# Patient Record
Sex: FEMALE | Race: WHITE | NOT HISPANIC OR LATINO | Employment: FULL TIME | URBAN - NONMETROPOLITAN AREA
[De-identification: names, ages, dates, MRNs, and addresses within clinical notes are randomized per-mention and may not be internally consistent; named-entity substitution may affect disease eponyms.]

---

## 2023-11-07 ENCOUNTER — OFFICE VISIT (OUTPATIENT)
Dept: OBSTETRICS AND GYNECOLOGY | Facility: CLINIC | Age: 33
End: 2023-11-07
Payer: COMMERCIAL

## 2023-11-07 VITALS
HEIGHT: 65 IN | DIASTOLIC BLOOD PRESSURE: 84 MMHG | BODY MASS INDEX: 33.49 KG/M2 | WEIGHT: 201 LBS | SYSTOLIC BLOOD PRESSURE: 142 MMHG

## 2023-11-07 DIAGNOSIS — Z01.411 ENCOUNTER FOR GYNECOLOGICAL EXAMINATION (GENERAL) (ROUTINE) WITH ABNORMAL FINDINGS: Primary | ICD-10-CM

## 2023-11-07 DIAGNOSIS — Z86.19 HISTORY OF HPV INFECTION: ICD-10-CM

## 2023-11-07 DIAGNOSIS — Z12.39 ENCOUNTER FOR BREAST CANCER SCREENING OTHER THAN MAMMOGRAM: ICD-10-CM

## 2023-11-07 DIAGNOSIS — N90.89 VULVAR LESION: ICD-10-CM

## 2023-11-07 PROCEDURE — 99385 PREV VISIT NEW AGE 18-39: CPT | Performed by: OBSTETRICS & GYNECOLOGY

## 2023-11-08 NOTE — PROGRESS NOTES
Chief Complaint  Gynecologic Exam     History of Present Illness:  Patient is 33 y.o.  who presents to [facility] here as a new patient for her annual examination.  Patient reports her last Pap smear was more than 3 years ago.  Patient thinks her last Pap smear was normal.  She does give a history of having HPV.  Patient had previous cryotherapy at the age of 16.  Patient also reports having frequent breakouts of vulvar lesions.  She reports it is worse around the time of her menstrual cycle as well as with stress.  She reports they will be extremely painful in nature.  She has been on antiviral medication in the past.  She thought however it was for HPV.  She has had a previous tubal ligation.  She does have menstrual cycles monthly.  They will vary slightly in timing.  At times they may be heavy.    History  Past Medical History:   Diagnosis Date    Abnormal ECG     Doctor stated small heart murmur, but nothing done.    Abnormal Pap smear of cervix 2006    Pre cancerous cells, used in office freeze treatment.    Herpes 2018    HPV (human papilloma virus) infection 2018    Previous gyno- [facility] before closing did a test and said i was positive. No more info than that.    Multiple gestation ,     2 kids, natural births, tubes tied.    Rh incompatibility     Took shots with both pregnancies.     No current outpatient medications on file prior to visit.     No current facility-administered medications on file prior to visit.     No Known Allergies  Past Surgical History:   Procedure Laterality Date    GYNECOLOGIC CRYOSURGERY      TUBAL ABDOMINAL LIGATION      After second birth of child.    WISDOM TOOTH EXTRACTION       Family History   Problem Relation Age of Onset    Diabetes Father         Takes meds for diabetes.    Diabetes Maternal Grandmother         Takes meds for diabetes.     Social History     Socioeconomic History    Marital status:   "  Tobacco Use    Smoking status: Never   Substance and Sexual Activity    Alcohol use: Not Currently     Comment: Rarely, for special occasions.    Drug use: Never    Sexual activity: Yes     Partners: Male     Birth control/protection: Tubal ligation       Physical Examination:  Vital Signs: /84   Ht 165.1 cm (65\")   Wt 91.2 kg (201 lb)   BMI 33.45 kg/m²     General Appearance: alert, appears stated age, and cooperative  Breasts: Examined in supine position  Symmetric without masses or skin dimpling  Nipples normal without inversion, lesions or discharge  There are no palpable axillary nodes  Abdomen: no masses, no hepatomegaly, no splenomegaly, soft non-tender, no guarding, and no rebound tenderness  Pelvic: Clinical staff was present for exam  External genitalia: Small superficial ulceration noted on the right labia minora.  Culture obtained.  :  urethral meatus normal;  Vaginal:  normal pink mucosa without prolapse or lesions.  Cervix:  normal appearance.  Uterus:  normal size, shape and consistency.  Adnexa:  normal bimanual exam of the adnexa.  Pap smear done and specimen sent using Thin-Prep technique    Data Review:  The following data was reviewed by: Dede Kinsey MD on 11/07/2023:     Labs:    Imaging:    Medical Records:  None    Assessment and Plan   1. Encounter for gynecological examination (general) (routine) with abnormal findings  Pap was done today.  If she does not receive the results of the Pap within 2 weeks  time, she was instructed to call to find out the results.  I explained to Sophie that the recommendations for Pap smear interval in a low risk patient has lengthened to 3 years time if cytology alone normal or  5 years time if both cytology and HPV testing were normal.  I encouraged her to be seen yearly for a full physical exam including breast and pelvic exam even during the off years when PAP's will not be performed.   - LIQUID-BASED PAP SMEAR WITH HPV GENOTYPING IF ASCUS " (ERINN,COR,MAD)    2. Encounter for breast cancer screening other than mammogram  Sophie was counseled regarding having clinical breast exams and breast self-awareness.  Women aged 29-39 years of age should have clinical breast exams every 1-3 years and yearly aged 40 and older.  The patient was counseled regarding breast self-awareness focusing on having a sense of what is normal for her breasts so that she can tell if there are changes.  Even small changes should be reported to provider.     3. Vulvar lesion  Patient with vulvar lesion as noted.  Her symptomatology is consistent with HSV.  We will await the results of her cultures.  I have discussed with the patient both treatment versus prophylaxis.  Patient is to consider the options as discussed.  She will call for culture results.  Instructions and precautions have been given.  - Herpes Simplex Virus (HSV) 1 & 2, JANA    4. History of HPV infection  Pap smear is obtained as noted.  Plan pending results.  - LIQUID-BASED PAP SMEAR WITH HPV GENOTYPING IF ASCUS (ERINN,COR,MAD)    Follow Up/Instructions:  Follow up as noted.  Patient was given instructions and counseling regarding her condition or for health maintenance advice. Please see specific information pulled into the AVS if appropriate.     Note: Speech recognition transcription software may have been used to dictate portions of this document.  An attempt at proofreading has been made though minor errors in transcription may still be present.    This note was electronically signed.  Dede Kinsey M.D.

## 2023-11-10 ENCOUNTER — PATIENT ROUNDING (BHMG ONLY) (OUTPATIENT)
Dept: OBSTETRICS AND GYNECOLOGY | Facility: CLINIC | Age: 33
End: 2023-11-10
Payer: COMMERCIAL

## 2023-11-10 NOTE — PROGRESS NOTES
November 10, 2023    Hello, may I speak with Sophie Dawkins?    My name is Michaela      I am  with [facility]  [address], [address]  [facility] [facility] [address] [phone].    Before we get started may I verify your date of birth? 1990    I am calling to officially welcome you to our practice and ask about your recent visit. Is this a good time to talk? yes    Tell me about your visit with us. What things went well?  Patient states that her appointment went really well.       We're always looking for ways to make our patients' experiences even better. Do you have recommendations on ways we may improve?  no    Overall were you satisfied with your first visit to our practice? yes       I appreciate you taking the time to speak with me today. Is there anything else I can do for you? no      Thank you, and have a great day.

## 2023-11-11 LAB
HSV1 DNA SPEC QL NAA+PROBE: NEGATIVE
HSV2 DNA SPEC QL NAA+PROBE: NEGATIVE

## 2023-11-13 LAB — REF LAB TEST METHOD: NORMAL

## 2025-01-21 ENCOUNTER — OFFICE VISIT (OUTPATIENT)
Dept: INTERNAL MEDICINE | Facility: CLINIC | Age: 35
End: 2025-01-21
Payer: COMMERCIAL

## 2025-01-21 VITALS
HEIGHT: 65 IN | BODY MASS INDEX: 31.16 KG/M2 | TEMPERATURE: 98.5 F | WEIGHT: 187 LBS | OXYGEN SATURATION: 97 % | DIASTOLIC BLOOD PRESSURE: 94 MMHG | HEART RATE: 94 BPM | SYSTOLIC BLOOD PRESSURE: 150 MMHG

## 2025-01-21 DIAGNOSIS — F41.9 ANXIETY: ICD-10-CM

## 2025-01-21 DIAGNOSIS — R03.0 ELEVATED BLOOD PRESSURE READING WITHOUT DIAGNOSIS OF HYPERTENSION: ICD-10-CM

## 2025-01-21 DIAGNOSIS — Z13.29 SCREENING FOR ENDOCRINE, METABOLIC AND IMMUNITY DISORDER: ICD-10-CM

## 2025-01-21 DIAGNOSIS — R22.41 LUMP OF RIGHT THIGH: ICD-10-CM

## 2025-01-21 DIAGNOSIS — R53.83 FATIGUE, UNSPECIFIED TYPE: ICD-10-CM

## 2025-01-21 DIAGNOSIS — Z13.0 SCREENING FOR ENDOCRINE, METABOLIC AND IMMUNITY DISORDER: ICD-10-CM

## 2025-01-21 DIAGNOSIS — R68.82 LOW LIBIDO: ICD-10-CM

## 2025-01-21 DIAGNOSIS — Z13.220 SCREENING FOR CHOLESTEROL LEVEL: ICD-10-CM

## 2025-01-21 DIAGNOSIS — L68.0 HIRSUTISM: ICD-10-CM

## 2025-01-21 DIAGNOSIS — L65.9 HAIR LOSS: ICD-10-CM

## 2025-01-21 DIAGNOSIS — K14.3 TONGUE COATING: ICD-10-CM

## 2025-01-21 DIAGNOSIS — I73.00 RAYNAUD'S DISEASE WITHOUT GANGRENE: ICD-10-CM

## 2025-01-21 DIAGNOSIS — J30.9 ALLERGIC RHINITIS, UNSPECIFIED SEASONALITY, UNSPECIFIED TRIGGER: ICD-10-CM

## 2025-01-21 DIAGNOSIS — Z13.0 SCREENING FOR DEFICIENCY ANEMIA: ICD-10-CM

## 2025-01-21 DIAGNOSIS — Z00.00 ANNUAL PHYSICAL EXAM: Primary | ICD-10-CM

## 2025-01-21 DIAGNOSIS — R06.83 SNORING: ICD-10-CM

## 2025-01-21 DIAGNOSIS — Z11.59 ENCOUNTER FOR HEPATITIS C SCREENING TEST FOR LOW RISK PATIENT: ICD-10-CM

## 2025-01-21 DIAGNOSIS — Z13.228 SCREENING FOR ENDOCRINE, METABOLIC AND IMMUNITY DISORDER: ICD-10-CM

## 2025-01-21 PROCEDURE — 99385 PREV VISIT NEW AGE 18-39: CPT | Performed by: NURSE PRACTITIONER

## 2025-01-21 PROCEDURE — 99214 OFFICE O/P EST MOD 30 MIN: CPT | Performed by: NURSE PRACTITIONER

## 2025-01-21 RX ORDER — FAMOTIDINE 10 MG
10 TABLET ORAL 2 TIMES DAILY
COMMUNITY

## 2025-01-21 RX ORDER — DIPHENOXYLATE HYDROCHLORIDE AND ATROPINE SULFATE 2.5; .025 MG/1; MG/1
TABLET ORAL DAILY
COMMUNITY

## 2025-01-21 NOTE — PROGRESS NOTES
Female Physical Note      Date: 2025   Patient Name: Sophie Dawkins  : 1990   MRN: [id number]     Chief Complaint:    Chief Complaint   Patient presents with    Establish Care     With [facility] today. Pt hasn't seen a primary care provider in 10 years. Has multiple symptoms she would like to discuss today: fatigue (snores at night, can sleep a full 8 hours but wakes up tired), hair loss, cold all the time, anxiety, white film on tongue X couple years, cheeks tend to be red, and possible cyst on inner right thigh near groin area (X 1 month, had chills).   Pt would like to definitely discuss the fatigue, anxiety, and cyst today.     History of Present Illness  34-year-old female presents to establish care/physical. Not been to the doctor in over 10 years.   She has multiple complaints today that were reviewed including tired all the time, not feeling refreshed after sleeping 8 hours per night, loud snoring,potential sleep apnea.  She has loss of hair more so than usual within the past 6 months, feels like there are clumps coming out every time she brushes or takes a shower. She washes hair around 2 to 3 times a week.  Notes she is cold most of the time. Her toes will turn blue as well as her fingers at times.  Stress and anxiety levels are extremely high. She has a mouth guard that she wears at night due to the grinding of her teeth. She works at [employer] and has a high stress environment. She does social media for them.  Her right ear feels like it is leaking sometimes when she wakes up. She has had previous ear infections and dizziness, nausea, and pain, not bothering her today. She has constant draining in the back of her throat.  She has a lump in her right upper thigh, groin area that she noticed around [place]. It was small and did not hurt until last week. It seems to have grown in size. She has not had a fever with it or any redness, erythema or warmth. It is tender to touch.   She has white  "coating on her tongue, which sometimes goes away with antibiotics, but as soon as she is off the antibiotics, it comes back and stays. Nothing seems to get rid of it even with using a tongue scraper.   She has had previous bumps under her armpits and a former provider said it was linked to caffeine, they are painful. She does not have any currently.  She gets black hairs on her chin and she has no sex drive, especially since  after having second child.  She is a  1 para 1. Her Pap is up to date. She does have a history of HPV and precancerous cells. Her previous Pap was normal. She is established with gynecology. Her periods she says are \"somewhat regular\" maybe 1 to 2 weeks off at times depending on her stress and anxiety levels, but otherwise normal, not too heavy or painful.  Vaccines/screenings reviewed     Subjective      Review of Systems:   Pertinent ROS in HPI    Past Medical History, Social History, Family History and Care Team were all reviewed with patient and updated as appropriate.     Medications:     Current Outpatient Medications:     famotidine (PEPCID) 10 MG tablet, Take 1 tablet by mouth 2 (Two) Times a Day., Disp: , Rfl:     multivitamin (MULTI VITAMIN PO), Take  by mouth Daily. Stephanie Mariscal's multivitamin + hair growth, Disp: , Rfl:     Allergies:   No Known Allergies    Immunizations:    There is no immunization history on file for this patient.    Pap:   Last Completed Pap Smear            PAP SMEAR (Every 3 Years) Next due on 2023  LIQUID-BASED PAP SMEAR WITH HPV GENOTYPING IF ASCUS (ERINN,COR,MAD)                   Tobacco Use: Low Risk  (2025)    Patient History     Smoking Tobacco Use: Never     Smokeless Tobacco Use: Never     Passive Exposure: Not on file     Social History     Substance and Sexual Activity   Alcohol Use Not Currently    Comment: Rarely, for special occasions.        Social History     Substance and Sexual Activity   Drug Use Never    "   PHQ-2 Depression Screening  Little interest or pleasure in doing things? Not at all   Feeling down, depressed, or hopeless? Not at all   PHQ-2 Total Score 0       Labs:  Results for orders placed or performed in visit on 23   LIQUID-BASED PAP SMEAR WITH HPV GENOTYPING IF ASCUS (ERINN,COR,MAD)    Collection Time: 23  2:23 PM    Specimen: ThinPrep Vial   Result Value Ref Range    Reference Lab Report       [facility]  [address] [place] [address]  Phone: [phone] or [phone]  Fax: [phone]  Sergey Maguire M.D., Medical Director    PATIENT NAME                                     LABORATORY NO.  [id number]   VERO YING                                 [id number]  [id number]                                 AGE                    SEX   SSN              CLIENT REF #  [facility]                        33        1990      F                      [id number]    [address]          REQUESTING M.PABLO.           ATTENDING M.D.         COPY TO.  [address] [place] [address]                         DATE COLLECTED            DATE RECEIVED          DATE REPORTED  2023    ThinPrep Pap with [facility]    DIAGNOSIS:  Negative for intraepithelial lesion or malignancy    Multiple factors can influence accuracy of Pap  tests; therefore, screening at  regular intervals is necessary for early cancer detection.    COMMENT:     Benign cellular changes associated with reactive/reparative changes  are present.  Professional interpretation rendered by Nir Wilson M.D.,F.C.A.P. at [employer] [facility] [employer], [address] [place] [address].  SPECIMEN ADEQUACY:  SATISFACTORY FOR EVALUATION  Transformation zone is present.  SOURCE OF SPECIMEN:       CERVICAL  SLIDES:  1  CLINICAL HISTORY:  Encounter for gynecological examination (general)  (routine) with  "abnormal findings  History of HPV infection, Vaginal lesion      CYTOTECHNOLOGIST:             [place], [place] (ASCP)                                      REVIEWED, DIAGNOSED AND  ELECTRONICALLY SIGNED BY:      Nir Wilson M.D.,F.C.A.P.    CPT CODES:  97323, 86026     Herpes Simplex Virus (HSV) 1 & 2, JANA    Collection Time: 11/07/23  3:15 PM    Specimen: Cervix; ThinPrep Vial   Result Value Ref Range    HSV 1 JANA Negative Negative    HSV 2 JANA Negative Negative       Objective     Vital Signs:   Vitals:    01/21/25 1406 01/21/25 1515   BP: 152/100 150/94   Pulse: 94    Temp: 98.5 °F (36.9 °C)    SpO2: 97%    Weight: 84.8 kg (187 lb)    Height: 165.1 cm (65\")    PainSc:   6    PainLoc: Leg    BMI is >= 30 and <35. (Class 1 Obesity). The following options were offered after discussion;: Information on healthy weight added to patient's after visit summary.     Physical Exam  Vitals and nursing note reviewed.   Constitutional:       Appearance: Normal appearance.   HENT:      Head: Normocephalic and atraumatic.      Right Ear: Ear canal and external ear normal. A middle ear effusion is present.      Left Ear: Tympanic membrane, ear canal and external ear normal.      Nose: Nose normal.      Mouth/Throat:      Lips: Pink.      Mouth: Mucous membranes are moist.      Pharynx: Oropharynx is clear.      Comments: White coating tongue   Eyes:      General: No scleral icterus.     Extraocular Movements: Extraocular movements intact.      Conjunctiva/sclera: Conjunctivae normal.      Pupils: Pupils are equal, round, and reactive to light.   Neck:      Thyroid: No thyromegaly.   Cardiovascular:      Rate and Rhythm: Normal rate and regular rhythm.      Pulses:           Dorsalis pedis pulses are 2+ on the right side and 2+ on the left side.        Posterior tibial pulses are 2+ on the right side and 2+ on the left side.      Heart sounds: Normal heart sounds.   Pulmonary:      Effort: Pulmonary effort is normal.      Breath sounds: " Normal breath sounds.   Abdominal:      General: Abdomen is flat. Bowel sounds are normal. There is no distension.      Palpations: Abdomen is soft. There is no mass.      Tenderness: There is no abdominal tenderness. There is no guarding or rebound.      Hernia: No hernia is present.   Musculoskeletal:         General: Normal range of motion.      Cervical back: Normal range of motion and neck supple.      Right lower leg: No edema.      Left lower leg: No edema.   Lymphadenopathy:      Cervical: No cervical adenopathy.   Skin:     General: Skin is warm and dry.      Findings: No rash.      Comments: Mass palpated right upper thigh, no erythema, warmth. Tenderness present.    Neurological:      General: No focal deficit present.      Mental Status: She is alert and oriented to person, place, and time. Mental status is at baseline.      Cranial Nerves: No cranial nerve deficit.      Sensory: No sensory deficit.      Motor: No weakness.      Coordination: Coordination normal.      Gait: Gait normal.   Psychiatric:         Mood and Affect: Mood normal.         Behavior: Behavior normal.         Thought Content: Thought content normal.         Judgment: Judgment normal.         Assessment / Plan      Assessment/Plan:   Diagnoses and all orders for this visit:    1. Annual physical exam (Primary)  -     CBC (No Diff)  -     Comprehensive Metabolic Panel  -     Lipid Panel  -     TSH Rfx On Abnormal To Free T4  -     Hemoglobin A1c  -     Vitamin D,25-Hydroxy  -     Vitamin B12  -     Folate  -     Hepatitis C Antibody  -     Iron and TIBC  -     Ferritin  -     17-Hydroxyprogesterone  -     Prolactin  -     Follicle Stimulating Hormone  -     Testosterone (Free & Total), LC / MS  -     DHEA-Sulfate    2. Screening for deficiency anemia  -     CBC (No Diff)  -     Vitamin B12  -     Folate  -     Iron and TIBC  -     Ferritin    3. Screening for cholesterol level  -     Lipid Panel    4. Screening for endocrine,  metabolic and immunity disorder  -     Comprehensive Metabolic Panel  -     TSH Rfx On Abnormal To Free T4  -     Hemoglobin A1c  -     17-Hydroxyprogesterone  -     Prolactin  -     Follicle Stimulating Hormone  -     Testosterone (Free & Total), LC / MS  -     DHEA-Sulfate    5. Encounter for hepatitis C screening test for low risk patient  -     Hepatitis C Antibody    6. Fatigue, unspecified type  -     CBC (No Diff)  -     Comprehensive Metabolic Panel  -     Lipid Panel  -     TSH Rfx On Abnormal To Free T4  -     Hemoglobin A1c  -     Vitamin D,25-Hydroxy  -     Vitamin B12  -     Folate  -     Hepatitis C Antibody  -     Iron and TIBC  -     Ferritin  -     17-Hydroxyprogesterone  -     Prolactin  -     Follicle Stimulating Hormone  -     Testosterone (Free & Total), LC / MS  -     DHEA-Sulfate  -     Ambulatory Referral to Neurology    7. Snoring  -     Ambulatory Referral to Neurology    8. Hair loss  -     CBC (No Diff)  -     Comprehensive Metabolic Panel  -     TSH Rfx On Abnormal To Free T4  -     Hemoglobin A1c  -     Vitamin D,25-Hydroxy  -     Vitamin B12  -     Folate  -     Iron and TIBC  -     Ferritin  -     17-Hydroxyprogesterone  -     Prolactin  -     Follicle Stimulating Hormone  -     Testosterone (Free & Total), LC / MS  -     DHEA-Sulfate    9. Raynaud's disease without gangrene  -     CBC (No Diff)  -     TSH Rfx On Abnormal To Free T4  -     Vitamin B12  -     Folate  -     Iron and TIBC  -     Ferritin    10. Anxiety  -     TSH Rfx On Abnormal To Free T4  -     17-Hydroxyprogesterone  -     Prolactin  -     Follicle Stimulating Hormone  -     Testosterone (Free & Total), LC / MS  -     DHEA-Sulfate  -     Ambulatory Referral to Psychiatry    11. Elevated blood pressure reading without diagnosis of hypertension  -     CBC (No Diff)  -     Comprehensive Metabolic Panel  -     Lipid Panel  -     TSH Rfx On Abnormal To Free T4    12. Allergic rhinitis, unspecified seasonality, unspecified  trigger  -     CBC (No Diff)    13. Lump of right thigh  -     Ambulatory Referral to General Surgery    14. Tongue coating  -     CBC (No Diff)  -     Comprehensive Metabolic Panel  -     TSH Rfx On Abnormal To Free T4  -     Hemoglobin A1c  -     Vitamin D,25-Hydroxy  -     Vitamin B12  -     Folate  -     Iron and TIBC  -     Ferritin    15. Hirsutism  -     TSH Rfx On Abnormal To Free T4  -     17-Hydroxyprogesterone  -     Prolactin  -     Follicle Stimulating Hormone  -     Testosterone (Free & Total), LC / MS  -     DHEA-Sulfate    16. Low libido  -     TSH Rfx On Abnormal To Free T4  -     17-Hydroxyprogesterone  -     Prolactin  -     Follicle Stimulating Hormone  -     Testosterone (Free & Total), LC / MS  -     DHEA-Sulfate       Assessment & Plan  Referral to neurology has been made to further evaluate fatigue and snoring, which are indicative of potential sleep apnea.  Raynaud's disease is suspected, mild. Information reviewed and printed for patient regarding management. CCB cab be considered for severe symptoms.   Referral to psychiatry placed for anxiety   Her anxiety may be contributing to her elevated blood pressure, but it is necessary to compare home readings with office readings. She has been advised to purchase a blood pressure cuff and monitor her blood pressure twice daily until her next appointment, maintaining a log. She has been advised to limit her caffeine intake to less than 200 mg per day, increase her water intake, and engage in at least 30 minutes of cardiovascular exercise five times a week. If her blood pressure consistently exceeds 140/90, treatment options will be considered.  She has been advised to use Flonase (fluticasone nasal spray), administering 2 sprays in each nostril, for allergic rhinitis. The correct administration technique was demonstrated. An over-the-counter oral antihistamine was also recommended. If her symptoms do not improve, a referral to an allergist or ENT  will be considered.  Lump on right thigh is suspected to be cyst or lipoma. It is tender and painful, but there are no signs of infection such as erythema, warmth, or fever. Due to worsening condition, increasing size, and the pain and discomfort it is causing her, a consult with general surgery is placed.   White tongue coating could be due to various factors such as dehydration, poor oral hygiene, thrush, vitamin deficiencies. Laboratory tests will be ordered to further evaluate. Referral to an ENT will be considered.  Hormone tests will be ordered to further evaluate for abnormalities and androgen excess due to hirsutism and low libido, hair loss, fatigue and other complaints.       Healthcare Maintenance:  Counseling provided based on age appropriate USPSTF guidelines and vaccinations   Encouraged 150 minutes of exercise total per week for heart health   Recommend balanced healthy diet   Dental visits twice per year, yearly eye exams, yearly skin assessments with dermatology   Pap every 3-5 years, self breast exam once per month    Sophie Dawkins voices understanding and acceptance of this advice and will call back with any further questions or concerns. AVS with preventive healthcare tips printed for patient.     Follow Up:   Return in about 1 month (around 2/21/2025) for blood pressure, labs .      LUCY Rivera  [facility]

## 2025-01-24 NOTE — PROGRESS NOTES
Patient: Sophie Dawkins    YOB: 1990    Date: 01/28/2025    Primary Care Provider: Shante Grey APRN    Chief Complaint   Patient presents with    Mass     right upper thigh/groin area.       SUBJECTIVE:    History of present illness:  The patient is in the office today for evaluation and treatment of a mass in right upper thigh/groin area. She states that onset was around [place]. She states originally it felt like a small nodule and it was not painful. Over the past two weeks, it has gotten larger and is now tender to palpation. She also reports overlying skin erythema. She denies any drainage. She has never had anything like this in the past.     The following portions of the patient's history were reviewed and updated as appropriate: allergies, current medications, past family history, past medical history, past social history, past surgical history and problem list.      Review of Systems:  Constitutional:  Negative for chills, fever, and unexpected weight change.  HENT: Negative for trouble swallowing and voice change.  Eyes:  Negative for visual disturbance.  Respiratory:  Negative for apnea, cough, chest tightness, shortness of breath, and wheezing.  Cardiovascular:  Negative for chest pain, palpitations, and leg swelling.  Gastrointestinal:  Negative for abdominal distention, abdominal pain, anal bleeding, blood in stool, constipation, diarrhea, nausea, rectal pain, and vomiting.  Musculoskeletal:  Negative for back pain, gait problem, and joint swelling.  Skin: Positive for right anterior thigh mass and pain.  Neurological:  Negative for dizziness, syncope, speech difficulty, weakness, numbness, and headaches.  Hematological:  Negative for adenopathy.  Does not bruise/bleed easily.  Psychiatric/Behavioral:  Negative for confusion.  The patient is not nervous/anxious.    Allergies:  No Known Allergies    Medications:    Current Outpatient Medications:     famotidine (PEPCID) 10 MG  tablet, Take 1 tablet by mouth 2 (Two) Times a Day., Disp: , Rfl:     ferrous sulfate 325 (65 FE) MG tablet, Take 1 tablet by mouth Daily With Breakfast. (Patient not taking: Reported on 1/28/2025), Disp: 90 tablet, Rfl: 0    multivitamin (MULTI VITAMIN PO), Take  by mouth Daily. Stephanie Mariscal's multivitamin + hair growth (Patient not taking: Reported on 1/28/2025), Disp: , Rfl:     mupirocin (BACTROBAN) 2 % ointment, Apply 1 Application topically to the appropriate area as directed 3 (Three) Times a Day. (Patient not taking: Reported on 1/28/2025), Disp: 30 g, Rfl: 0    vitamin D (ERGOCALCIFEROL) 1.25 MG (49206 UT) capsule capsule, Take 1 capsule by mouth 1 (One) Time Per Week. (Patient not taking: Reported on 1/28/2025), Disp: 12 capsule, Rfl: 0    History:  Past Medical History:   Diagnosis Date    Abnormal ECG 2000    Doctor stated small heart murmur, but nothing done.    Abnormal Pap smear of cervix 2006    Pre cancerous cells, used in office freeze treatment.    Anxiety     Chronic ear infection     right ear mostly    Headache     Herpes 2018    HPV (human papilloma virus) infection 2018    Previous gyno- [facility] before closing did a test and said i was positive. No more info than that.    Hypertension 1/21/25    Dr. De La Torre for a month.    Multiple gestation 2007, 2010    2 kids, natural births, tubes tied.    Rh incompatibility 2007    Took shots with both pregnancies.       Past Surgical History:   Procedure Laterality Date    GYNECOLOGIC CRYOSURGERY      TUBAL ABDOMINAL LIGATION  2010    After second birth of child.    WISDOM TOOTH EXTRACTION         Family History   Problem Relation Age of Onset    Diabetes Father         Type 2.    Depression Father     Hyperlipidemia Father     Hypertension Father     Diabetes Maternal Grandmother         Type 2.    Hyperlipidemia Maternal Grandmother     Vision loss Maternal Grandmother         Glaucoma / Wet Macular Degeneration    Hypertension Maternal  "Grandmother     Anxiety disorder Mother     Depression Mother     Hyperlipidemia Mother     Thyroid disease Mother         Grave’s Disease    Hypertension Mother     Heart disease Maternal Grandfather          of heart attack with sepsis.    Hyperlipidemia Maternal Grandfather     Hypertension Maternal Grandfather        Social History     Tobacco Use    Smoking status: Never    Smokeless tobacco: Never   Vaping Use    Vaping status: Never Used   Substance Use Topics    Alcohol use: Not Currently     Comment: Rarely, for special occasions.    Drug use: Never        OBJECTIVE:    Vital Signs:   Vitals:    25 1117   BP: 118/68   Pulse: 107   Resp: 18   Temp: 97.7 °F (36.5 °C)   TempSrc: Temporal   SpO2: 99%   Weight: 82.6 kg (182 lb)   Height: 165.1 cm (65\")     Physical Exam:     General Appearance:    Alert, cooperative, in no acute distress   Head:    Normocephalic, without obvious abnormality, atraumatic   Eyes:            Lids and lashes normal, conjunctivae and sclerae normal, no   icterus, no pallor, corneas clear, PERRLA   Throat:   No oral lesions, no thrush, oral mucosa moist   Lungs:     Clear to auscultation,respirations regular, even and                  unlabored    Heart:    Regular rhythm and normal rate, normal S1 and S2, no            murmur, no gallop, no rub, no click   Abdomen:     Normal bowel sounds, no masses, no organomegaly, soft        non-tender, non-distended, no guarding   Extremities:   Moves all extremities well, no edema, no cyanosis, no             redness   Pulses:   Pulses palpable and equal bilaterally   Skin:   Right anteromedial thigh/groin swelling, tender to palpation, overlying skin erythema.   Neurologic:   Cranial nerves 2 - 12 grossly intact, sensation intact, DTR       present and equal bilaterally       Results Review:   None    Review of Systems was reviewed and confirmed as accurate as documented by the MA.    ASSESSMENT/PLAN:    1. Mass of right thigh  "     Patient was referred to me for a mass/swelling on her anteromedial right thigh. On physical exam, my differential is large infected cyst versus lymphadenopathy versus femoral hernia. I recommend proceeding with office ultrasound of the area. Office ultrasound completed and appears to be enlarged lymph nodes with significant surrounding tissue edema. I recommend proceeding with ultrasound guided needle aspiration. We will start her on an antibiotic as well. Will call the patient when results are available. Patient is agreeable to the plan and all questions were answered.      Electronically signed by Franny Cornejo DO  01/28/25    Procedure:    I recommend a FNA of right anterior thigh lesion. The procedure and risks were clearly explained including bleeding, infection and requirement for re-biopsy and the patient understood these and wishes to proceed.    The patient was brought to the procedure room. Consent and time out were performed. The area was prepped and draped in the usual fashion. 1% lidocaine with epinephrine was infused locally. An 18G needle was used to aspirate the presumed node with ultrasound guidance. Minimal amount of blood and tissue was collected. This was sent to pathology for cytology. Another 18G needle was used to aspirate fluid around the node which returned 2 cc of purulent fluid. This fluid was sent for culture. Minimal blood loss had occurred and hemostasis had been well controlled with pressure.  The patient tolerated the procedure and there were no complications. A band-aid was placed. I will call the patient with the results when they are available.

## 2025-01-26 LAB
17OHP SERPL-MCNC: NORMAL NG/DL
25(OH)D3+25(OH)D2 SERPL-MCNC: 11.9 NG/ML (ref 30–100)
ALBUMIN SERPL-MCNC: 4 G/DL (ref 3.5–5.2)
ALBUMIN/GLOB SERPL: 1.1 G/DL
ALP SERPL-CCNC: 58 U/L (ref 39–117)
ALT SERPL-CCNC: 8 U/L (ref 1–33)
AST SERPL-CCNC: 11 U/L (ref 1–32)
BILIRUB SERPL-MCNC: 0.6 MG/DL (ref 0–1.2)
BUN SERPL-MCNC: 11 MG/DL (ref 6–20)
BUN/CREAT SERPL: 11.8 (ref 7–25)
CALCIUM SERPL-MCNC: 9.1 MG/DL (ref 8.6–10.5)
CHLORIDE SERPL-SCNC: 100 MMOL/L (ref 98–107)
CHOLEST SERPL-MCNC: 185 MG/DL (ref 0–200)
CO2 SERPL-SCNC: 25.7 MMOL/L (ref 22–29)
CREAT SERPL-MCNC: 0.93 MG/DL (ref 0.57–1)
DHEA-S SERPL-MCNC: 383 UG/DL (ref 57.3–279.2)
EGFRCR SERPLBLD CKD-EPI 2021: 81.9 ML/MIN/1.73
ERYTHROCYTE [DISTWIDTH] IN BLOOD BY AUTOMATED COUNT: 16.5 % (ref 12.3–15.4)
FERRITIN SERPL-MCNC: 47.9 NG/ML (ref 13–150)
FOLATE SERPL-MCNC: 12.1 NG/ML (ref 4.78–24.2)
FSH SERPL-ACNC: 6.2 MIU/ML
GLOBULIN SER CALC-MCNC: 3.5 GM/DL
GLUCOSE SERPL-MCNC: 128 MG/DL (ref 65–99)
HBA1C MFR BLD: 6.5 % (ref 4.8–5.6)
HCT VFR BLD AUTO: 38.3 % (ref 34–46.6)
HCV IGG SERPL QL IA: NON REACTIVE
HDLC SERPL-MCNC: 37 MG/DL (ref 40–60)
HGB BLD-MCNC: 11.4 G/DL (ref 12–15.9)
IRON SATN MFR SERPL: 5 % (ref 20–50)
IRON SERPL-MCNC: 21 MCG/DL (ref 37–145)
LDLC SERPL CALC-MCNC: 125 MG/DL (ref 0–100)
MCH RBC QN AUTO: 22.4 PG (ref 26.6–33)
MCHC RBC AUTO-ENTMCNC: 29.8 G/DL (ref 31.5–35.7)
MCV RBC AUTO: 75.4 FL (ref 79–97)
PLATELET # BLD AUTO: 492 10*3/MM3 (ref 140–450)
POTASSIUM SERPL-SCNC: 4.1 MMOL/L (ref 3.5–5.2)
PROLACTIN SERPL-MCNC: 30.7 NG/ML (ref 4.8–33.4)
PROT SERPL-MCNC: 7.5 G/DL (ref 6–8.5)
RBC # BLD AUTO: 5.08 10*6/MM3 (ref 3.77–5.28)
SODIUM SERPL-SCNC: 136 MMOL/L (ref 136–145)
TESTOST FREE SERPL-MCNC: 3.8 PG/ML (ref 0–4.2)
TESTOST SERPL-MCNC: 16.1 NG/DL (ref 10–55)
TIBC SERPL-MCNC: 405 MCG/DL
TRIGL SERPL-MCNC: 124 MG/DL (ref 0–150)
TSH SERPL DL<=0.005 MIU/L-ACNC: 0.86 UIU/ML (ref 0.27–4.2)
UIBC SERPL-MCNC: 384 MCG/DL (ref 112–346)
VIT B12 SERPL-MCNC: 367 PG/ML (ref 211–946)
VLDLC SERPL CALC-MCNC: 23 MG/DL (ref 5–40)
WBC # BLD AUTO: 8.74 10*3/MM3 (ref 3.4–10.8)

## 2025-01-27 ENCOUNTER — REFERRAL TRIAGE (OUTPATIENT)
Dept: CASE MANAGEMENT | Facility: OTHER | Age: 35
End: 2025-01-27
Payer: COMMERCIAL

## 2025-01-27 ENCOUNTER — OFFICE VISIT (OUTPATIENT)
Dept: INTERNAL MEDICINE | Facility: CLINIC | Age: 35
End: 2025-01-27
Payer: COMMERCIAL

## 2025-01-27 VITALS — HEART RATE: 105 BPM | TEMPERATURE: 97.6 F | OXYGEN SATURATION: 98 %

## 2025-01-27 DIAGNOSIS — E11.65 UNCONTROLLED DIABETES MELLITUS WITH HYPERGLYCEMIA, WITHOUT LONG-TERM CURRENT USE OF INSULIN: Primary | ICD-10-CM

## 2025-01-27 DIAGNOSIS — R79.89 ELEVATED DHEA: ICD-10-CM

## 2025-01-27 DIAGNOSIS — E11.65 TYPE 2 DIABETES MELLITUS WITH HYPERGLYCEMIA, WITHOUT LONG-TERM CURRENT USE OF INSULIN: Primary | ICD-10-CM

## 2025-01-27 DIAGNOSIS — E55.9 VITAMIN D DEFICIENCY: ICD-10-CM

## 2025-01-27 DIAGNOSIS — D50.8 IRON DEFICIENCY ANEMIA SECONDARY TO INADEQUATE DIETARY IRON INTAKE: ICD-10-CM

## 2025-01-27 DIAGNOSIS — D50.8 OTHER IRON DEFICIENCY ANEMIA: ICD-10-CM

## 2025-01-27 DIAGNOSIS — N92.1 MENORRHAGIA WITH IRREGULAR CYCLE: ICD-10-CM

## 2025-01-27 DIAGNOSIS — L08.9 INFECTION, FACE: ICD-10-CM

## 2025-01-27 PROCEDURE — 99214 OFFICE O/P EST MOD 30 MIN: CPT | Performed by: NURSE PRACTITIONER

## 2025-01-27 RX ORDER — FERROUS SULFATE 325(65) MG
325 TABLET ORAL
Qty: 90 TABLET | Refills: 0 | Status: SHIPPED | OUTPATIENT
Start: 2025-01-27

## 2025-01-27 RX ORDER — ERGOCALCIFEROL 1.25 MG/1
50000 CAPSULE, LIQUID FILLED ORAL WEEKLY
Qty: 12 CAPSULE | Refills: 0 | Status: SHIPPED | OUTPATIENT
Start: 2025-01-27

## 2025-01-27 RX ORDER — MUPIROCIN 20 MG/G
1 OINTMENT TOPICAL 3 TIMES DAILY
Qty: 30 G | Refills: 0 | Status: SHIPPED | OUTPATIENT
Start: 2025-01-27

## 2025-01-27 NOTE — PROGRESS NOTES
Office Visit      Date: 2025   Patient Name: Sophie Dawkins  : 1990   MRN: [id number]     Chief Complaint:    Chief Complaint   Patient presents with    Follow-up     To discuss recent lab results    Abstract     red place under left eye X couple days, just woke up with it       History of Present Illness: Sophie Dawkins is a 34 y.o. female presenting with significant other for lab review. Labs show FER, Vitamin D deficiency, A1c 6.5% and glucose 128, elevated DHEA. She has a significant family history of T2DM. Diet and poor and drinks a lot of soda.   Red skin lesion under left eye x 2 days, woke up with it.    Subjective      Review of Systems:   Pertinent ROS noted in HPI.     I have reviewed the patients family history, social history, past medical history, past surgical history and have updated it as appropriate.     Medications:     Current Outpatient Medications:     famotidine (PEPCID) 10 MG tablet, Take 1 tablet by mouth 2 (Two) Times a Day., Disp: , Rfl:     multivitamin (MULTI VITAMIN PO), Take  by mouth Daily. Stephanie Mariscal's multivitamin + hair growth, Disp: , Rfl:     ferrous sulfate 325 (65 FE) MG tablet, Take 1 tablet by mouth Daily With Breakfast., Disp: 90 tablet, Rfl: 0    mupirocin (BACTROBAN) 2 % ointment, Apply 1 Application topically to the appropriate area as directed 3 (Three) Times a Day., Disp: 30 g, Rfl: 0    vitamin D (ERGOCALCIFEROL) 1.25 MG (01380 UT) capsule capsule, Take 1 capsule by mouth 1 (One) Time Per Week., Disp: 12 capsule, Rfl: 0    Allergies:   No Known Allergies    Objective     Physical Exam  Vitals and nursing note reviewed.   Constitutional:       Appearance: Normal appearance.   Eyes:      Extraocular Movements: Extraocular movements intact.     Cardiovascular:      Rate and Rhythm: Normal rate.   Pulmonary:      Effort: Pulmonary effort is normal.   Musculoskeletal:         General: Normal range of motion.      Cervical back: Normal range of motion.    Skin:     General: Skin is dry.   Neurological:      Mental Status: She is alert and oriented to person, place, and time.   Psychiatric:         Mood and Affect: Mood normal.         Vital Signs:   Vitals:    01/27/25 1117   Pulse: 105   Temp: 97.6 °F (36.4 °C)   SpO2: 98%     There is no height or weight on file to calculate BMI.        Labs:   Office Visit on 01/21/2025   Component Date Value Ref Range Status    WBC 01/23/2025 8.74  3.40 - 10.80 10*3/mm3 Final    RBC 01/23/2025 5.08  3.77 - 5.28 10*6/mm3 Final    Hemoglobin 01/23/2025 11.4 (L)  12.0 - 15.9 g/dL Final    Hematocrit 01/23/2025 38.3  34.0 - 46.6 % Final    MCV 01/23/2025 75.4 (L)  79.0 - 97.0 fL Final    MCH 01/23/2025 22.4 (L)  26.6 - 33.0 pg Final    MCHC 01/23/2025 29.8 (L)  31.5 - 35.7 g/dL Final    RDW 01/23/2025 16.5 (H)  12.3 - 15.4 % Final    Platelets 01/23/2025 492 (H)  140 - 450 10*3/mm3 Final    Glucose 01/23/2025 128 (H)  65 - 99 mg/dL Final    BUN 01/23/2025 11  6 - 20 mg/dL Final    Creatinine 01/23/2025 0.93  0.57 - 1.00 mg/dL Final    EGFR Result 01/23/2025 81.9  >60.0 mL/min/1.73 Final    Comment: GFR Categories in Chronic Kidney Disease (CKD)/X09/  /X09/  GFR Category          GFR (mL/min/1.73)    Interpretation  G1/X09/                    90 or greater/X09/        Normal or high  (1)  G2//                    60-89                Mild decrease  (1)  G3a                   45-59                Mild to moderate  decrease  G3b                   30-44                Moderate to  severe decrease  G4                    15-29                Severe decrease  G5                    14 or less           Kidney failure//  /[id number]/  (1)In the absence of evidence of kidney disease, neither  GFR category G1 or G2 fulfill the criteria for CKD.  eGFR calculation 2021 CKD-EPI creatinine equation, which  does not include race as a factor      BUN/Creatinine Ratio 01/23/2025 11.8  7.0 - 25.0 Final    Sodium 01/23/2025 136  136 - 145  mmol/L Final    Potassium 01/23/2025 4.1  3.5 - 5.2 mmol/L Final    Chloride 01/23/2025 100  98 - 107 mmol/L Final    Total CO2 01/23/2025 25.7  22.0 - 29.0 mmol/L Final    Calcium 01/23/2025 9.1  8.6 - 10.5 mg/dL Final    Total Protein 01/23/2025 7.5  6.0 - 8.5 g/dL Final    Albumin 01/23/2025 4.0  3.5 - 5.2 g/dL Final    Globulin 01/23/2025 3.5  gm/dL Final    A/G Ratio 01/23/2025 1.1  g/dL Final    Total Bilirubin 01/23/2025 0.6  0.0 - 1.2 mg/dL Final    Alkaline Phosphatase 01/23/2025 58  39 - 117 U/L Final    AST (SGOT) 01/23/2025 11  1 - 32 U/L Final    ALT (SGPT) 01/23/2025 8  1 - 33 U/L Final    Total Cholesterol 01/23/2025 185  0 - 200 mg/dL Final    Comment: Cholesterol Reference Ranges  (U.S. Department of Health and Human Services ATP III  Classifications)  Desirable          <200 mg/dL  Borderline High    200-239 mg/dL  High Risk          >240 mg/dL  Triglyceride Reference Ranges  (U.S. Department of Health and Human Services ATP III  Classifications)  Normal           <150 mg/dL  Borderline High  150-199 mg/dL  High             200-499 mg/dL  Very High        >500 mg/dL  HDL Reference Ranges  (U.S. Department of Health and Human Services ATP III  Classifications)  Low     <40 mg/dl (major risk factor for CHD)  High    >60 mg/dl ('negative' risk factor for CHD)  LDL Reference Ranges  (U.S. Department of Health and Human Services ATP III  Classifications)  Optimal          <100 mg/dL  Near Optimal     100-129 mg/dL  Borderline High  130-159 mg/dL  High             160-189 mg/dL  Very High        >189 mg/dL  LDL is calculated using the NIH LDL-C calculation.      Triglycerides 01/23/2025 124  0 - 150 mg/dL Final    HDL Cholesterol 01/23/2025 37 (L)  40 - 60 mg/dL Final    VLDL Cholesterol Vern 01/23/2025 23  5 - 40 mg/dL Final    LDL Chol Calc (NIH) 01/23/2025 125 (H)  0 - 100 mg/dL Final    TSH 01/23/2025 0.858  0.270 - 4.200 uIU/mL Final    Hemoglobin A1C 01/23/2025 6.50 (H)  4.80 - 5.60 % Final     Comment: Hemoglobin A1C Ranges:  Increased Risk for Diabetes  5.7% to 6.4%  Diabetes                     >= 6.5%  Diabetic Goal                < 7.0%      25 Hydroxy, Vitamin D 01/23/2025 11.9 (L)  30.0 - 100.0 ng/ml Final    Comment: Reference Range for Total Vitamin D 25(OH)  Deficiency <20.0 ng/mL  Insufficiency 21-29 ng/mL  Sufficiency  ng/mL  Toxicity >100 ng/ml      Vitamin B-12 01/23/2025 367  211 - 946 pg/mL Final    Results may be falsely increased if patient taking Biotin.    Folate 01/23/2025 12.10  4.78 - 24.20 ng/mL Final    Results may be falsely increased if patient taking Biotin.    Hep C Virus Ab 01/23/2025 Non Reactive  Non Reactive Final    Comment: HCV antibody alone does not differentiate between previously  resolved infection and active infection. Equivocal and Reactive  HCV antibody results should be followed up with an HCV RNA test  to support the diagnosis of active HCV infection.      TIBC 01/23/2025 405  mcg/dL Final    UIBC 01/23/2025 384 (H)  112 - 346 mcg/dL Final    Iron 01/23/2025 21 (L)  37 - 145 mcg/dL Final    Iron Saturation 01/23/2025 5 (L)  20 - 50 % Final    Ferritin 01/23/2025 47.90  13.00 - 150.00 ng/mL Final    Results may be falsely decreased if patient taking Biotin.    Prolactin 01/23/2025 30.7  4.8 - 33.4 ng/mL Final    FSH 01/23/2025 6.2  mIU/mL Final    Comment:                      Adult Female             Range                        Follicular phase      3.5 -  12.5                        Ovulation phase       4.7 -  21.5                        Luteal phase          1.7 -   7.7                        Postmenopausal       25.8 - 134.8      Testosterone, Total 01/23/2025 16.1  10.0 - 55.0 ng/dL Final    Testosterone, Free 01/23/2025 3.8  0.0 - 4.2 pg/mL Final    DHEA-Sulfate 01/23/2025 383.0 (H)  57.3 - 279.2 ug/dL Final        Assessment / Plan      Assessment/Plan:   Diagnoses and all orders for this visit:    1. Type 2 diabetes mellitus with hyperglycemia,  without long-term current use of insulin (Primary)  -     Ambulatory Referral to Chronic Care Management Disease Education, Preventative Care, Caregiving/Support    2. Iron deficiency anemia secondary to inadequate dietary iron intake  -     ferrous sulfate 325 (65 FE) MG tablet; Take 1 tablet by mouth Daily With Breakfast.  Dispense: 90 tablet; Refill: 0  -     CBC & Differential; Future  -     Iron and TIBC; Future  -     Ferritin; Future    3. Vitamin D deficiency  -     vitamin D (ERGOCALCIFEROL) 1.25 MG (30913 UT) capsule capsule; Take 1 capsule by mouth 1 (One) Time Per Week.  Dispense: 12 capsule; Refill: 0    4. Infection, face  -     mupirocin (BACTROBAN) 2 % ointment; Apply 1 Application topically to the appropriate area as directed 3 (Three) Times a Day.  Dispense: 30 g; Refill: 0    5. Elevated DHEA    6. Menorrhagia with irregular cycle       Newly diagnosed Type 2 Diabetic   Follow diabetic diet by decreasing carbohydrates, sugar, and processed foods.   Exercise encouraged as tolerated-- discussed low impact walking 30 minutes/day 5 days/week minimally.   Discussed metformin and other medications; pt wishes to work on lifestyle changes first   Annual eye exam encouraged  Risk discussion regarding uncontrolled diabetes  Follow up 3 months after lifestyle changes for recheck A1c  Referral chronic care manager    FER  Start ferrous sulfate daily with breakfast, increase iron in diet and vitamin C to increase absorption. Recheck labs 6 weeks     Vitamin D deficiency   Start high dose Vitamin D supplement once a week x 12 weeks, then take 5000 IU daily otc    Infection face  Start mupirocin ointment to site on face TID, if no improvement consider oral antibiotics    Elevated DHEA  Reviewed differentials. She is going to schedule with gynecology as she has heavy menstruals first 2 days, hirituism and irregular periods      Follow Up:   Return in about 3 months (around 4/27/2025) for Diabetes.        Shante  Que AYALA  [facility] [place]

## 2025-01-28 ENCOUNTER — PATIENT MESSAGE (OUTPATIENT)
Dept: CASE MANAGEMENT | Facility: OTHER | Age: 35
End: 2025-01-28
Payer: COMMERCIAL

## 2025-01-28 ENCOUNTER — PATIENT OUTREACH (OUTPATIENT)
Dept: CASE MANAGEMENT | Facility: OTHER | Age: 35
End: 2025-01-28
Payer: COMMERCIAL

## 2025-01-28 ENCOUNTER — OFFICE VISIT (OUTPATIENT)
Dept: SURGERY | Facility: CLINIC | Age: 35
End: 2025-01-28
Payer: COMMERCIAL

## 2025-01-28 VITALS
DIASTOLIC BLOOD PRESSURE: 68 MMHG | BODY MASS INDEX: 30.32 KG/M2 | WEIGHT: 182 LBS | HEIGHT: 65 IN | HEART RATE: 107 BPM | RESPIRATION RATE: 18 BRPM | SYSTOLIC BLOOD PRESSURE: 118 MMHG | TEMPERATURE: 97.7 F | OXYGEN SATURATION: 99 %

## 2025-01-28 DIAGNOSIS — E11.65 UNCONTROLLED DIABETES MELLITUS WITH HYPERGLYCEMIA, WITHOUT LONG-TERM CURRENT USE OF INSULIN: Primary | ICD-10-CM

## 2025-01-28 DIAGNOSIS — D50.8 OTHER IRON DEFICIENCY ANEMIA: ICD-10-CM

## 2025-01-28 DIAGNOSIS — R22.41 MASS OF RIGHT THIGH: Primary | ICD-10-CM

## 2025-01-28 PROCEDURE — 10005 FNA BX W/US GDN 1ST LES: CPT | Performed by: STUDENT IN AN ORGANIZED HEALTH CARE EDUCATION/TRAINING PROGRAM

## 2025-01-28 PROCEDURE — 99204 OFFICE O/P NEW MOD 45 MIN: CPT | Performed by: STUDENT IN AN ORGANIZED HEALTH CARE EDUCATION/TRAINING PROGRAM

## 2025-01-28 NOTE — OUTREACH NOTE
AMBULATORY CASE MANAGEMENT NOTE    Names and Relationships of Patient/Support Persons: Contact: Sophie Dawkins; Relationship: Self -     Patient Outreach    [facility] outreach. Patient was referred by provider for new diagnosis of DM2 with an A1c 6.5. Purpose and potential benefits of chronic care management program explained. Patient expressed appreciation for the outreach but declined participation at this time due to financial concerns related to current co pay and deductibles.     She did express interest in working with [facility] for better understanding of how to self manage DM2 and prevent related complications. Reviewed importance of water intake and discussed decreasing and/or eliminating ANN-MARIE-8 sodas, physical activity and consistent eating with limiting concentrated sweets and high carb foods.     Reviewed list of iron rich foods, use of cast iron in cooking and possible side effects of iron supplement. Reviewed best ways to enhance iron absorption. Discussed this or that lifestyle. Reviewed multiple free accredited sites for additional diabetes information.    Contact information for [facility], [facility] 24/7 Nurse Line and [facility] questionnaire provided via [employer]. Patient had no further questions at this time. Advised to contact [facility] for any needs. Next outreach scheduled.    Education Documentation  Type 2 Diabetes, taught by Jennifer Leger, RN at 1/28/2025  5:44 PM.  Learner: Patient  Readiness: Acceptance  Method: Explanation, Teach Back  Response: Verbalizes Understanding, Needs Reinforcement    Type 2 Diabetes Mellitus, Self-Care, Adult, taught by Jennifer Leger, RN at 1/28/2025  5:44 PM.  Learner: Patient  Readiness: Acceptance  Method: Explanation, Teach Back  Response: Verbalizes Understanding, Needs Reinforcement    Diabetes Mellitus and Sick Day Management, taught by Jennifer Leger, RN at 1/28/2025  5:44 PM.  Learner: Patient  Readiness: Acceptance  Method: Explanation, Teach Back  Response:  Verbalizes Understanding, Needs Reinforcement    Unresolved/Worsening Symptoms, taught by Jennifer Leger RN at 1/28/2025  5:44 PM.  Learner: Patient  Readiness: Acceptance  Method: Explanation, Teach Back  Response: Verbalizes Understanding, Needs Reinforcement    Prevention of Complications, taught by Jennifer Leger RN at 1/28/2025  5:44 PM.  Learner: Patient  Readiness: Acceptance  Method: Explanation, Teach Back  Response: Verbalizes Understanding, Needs Reinforcement    Chronic Complications, taught by Jennifer Leger RN at 1/28/2025  5:44 PM.  Learner: Patient  Readiness: Acceptance  Method: Explanation, Teach Back  Response: Verbalizes Understanding, Needs Reinforcement    Benefits, taught by Jennifer Leger RN at 1/28/2025  5:44 PM.  Learner: Patient  Readiness: Acceptance  Method: Explanation, Teach Back  Response: Verbalizes Understanding, Needs Reinforcement    Consistent Eating Pattern, taught by Jennifer Leger RN at 1/28/2025  5:44 PM.  Learner: Patient  Readiness: Acceptance  Method: Explanation, Teach Back  Response: Verbalizes Understanding, Needs Reinforcement    Carbohydrate Counting, taught by Jennifer Leger RN at 1/28/2025  5:44 PM.  Learner: Patient  Readiness: Acceptance  Method: Explanation, Teach Back  Response: Verbalizes Understanding, Needs Reinforcement    Monitoring Carbohydrate Intake, taught by Jennifer Leger RN at 1/28/2025  5:44 PM.  Learner: Patient  Readiness: Acceptance  Method: Explanation, Teach Back  Response: Verbalizes Understanding, Needs Reinforcement    Healthy Food Choices, taught by Jennifer Leger RN at 1/28/2025  5:44 PM.  Learner: Patient  Readiness: Acceptance  Method: Explanation, Teach Back  Response: Verbalizes Understanding, Needs Reinforcement    Carbohydrate-Containing Foods, taught by Jennifer Leger RN at 1/28/2025  5:44 PM.  Learner: Patient  Readiness: Acceptance  Method: Explanation, Teach Back  Response: Verbalizes Understanding, Needs  Reinforcement    Oral Medication, taught by Jennifer Leger RN at 1/28/2025  5:44 PM.  Learner: Patient  Readiness: Acceptance  Method: Explanation, Teach Back  Response: Verbalizes Understanding, Needs Reinforcement    Blood Glucose Goal, taught by Jennifer Leger RN at 1/28/2025  5:44 PM.  Learner: Patient  Readiness: Acceptance  Method: Explanation, Teach Back  Response: Verbalizes Understanding, Needs Reinforcement    A1C Goal, taught by Jennifer Leger RN at 1/28/2025  5:44 PM.  Learner: Patient  Readiness: Acceptance  Method: Explanation, Teach Back  Response: Verbalizes Understanding, Needs Reinforcement    Frequency of Testing, taught by Jennifer Leger RN at 1/28/2025  5:44 PM.  Learner: Patient  Readiness: Acceptance  Method: Explanation, Teach Back  Response: Verbalizes Understanding, Needs Reinforcement    Definition, taught by Jennifer Leger RN at 1/28/2025  5:44 PM.  Learner: Patient  Readiness: Acceptance  Method: Explanation, Teach Back  Response: Verbalizes Understanding, Needs Reinforcement    Diagnosis Criteria, taught by Jennifer Leger RN at 1/28/2025  5:44 PM.  Learner: Patient  Readiness: Acceptance  Method: Explanation, Teach Back  Response: Verbalizes Understanding, Needs Reinforcement    Diabetes, Type 2, taught by Jennifer Leger RN at 1/28/2025  5:44 PM.  Learner: Patient  Readiness: Acceptance  Method: Explanation, Teach Back  Response: Verbalizes Understanding, Needs Reinforcement          Jennifer CARVAJAL  Ambulatory Case Management    1/28/2025, 17:45 EST

## 2025-01-29 RX ORDER — SULFAMETHOXAZOLE AND TRIMETHOPRIM 800; 160 MG/1; MG/1
1 TABLET ORAL 2 TIMES DAILY
Qty: 14 TABLET | Refills: 0 | Status: SHIPPED | OUTPATIENT
Start: 2025-01-29

## 2025-01-30 ENCOUNTER — PATIENT ROUNDING (BHMG ONLY) (OUTPATIENT)
Dept: SURGERY | Facility: CLINIC | Age: 35
End: 2025-01-30
Payer: COMMERCIAL

## 2025-01-30 LAB
17OHP SERPL-MCNC: 30 NG/DL
25(OH)D3+25(OH)D2 SERPL-MCNC: 11.9 NG/ML (ref 30–100)
ALBUMIN SERPL-MCNC: 4 G/DL (ref 3.5–5.2)
ALBUMIN/GLOB SERPL: 1.1 G/DL
ALP SERPL-CCNC: 58 U/L (ref 39–117)
ALT SERPL-CCNC: 8 U/L (ref 1–33)
AST SERPL-CCNC: 11 U/L (ref 1–32)
BILIRUB SERPL-MCNC: 0.6 MG/DL (ref 0–1.2)
BUN SERPL-MCNC: 11 MG/DL (ref 6–20)
BUN/CREAT SERPL: 11.8 (ref 7–25)
CALCIUM SERPL-MCNC: 9.1 MG/DL (ref 8.6–10.5)
CHLORIDE SERPL-SCNC: 100 MMOL/L (ref 98–107)
CHOLEST SERPL-MCNC: 185 MG/DL (ref 0–200)
CO2 SERPL-SCNC: 25.7 MMOL/L (ref 22–29)
CREAT SERPL-MCNC: 0.93 MG/DL (ref 0.57–1)
DHEA-S SERPL-MCNC: 383 UG/DL (ref 57.3–279.2)
EGFRCR SERPLBLD CKD-EPI 2021: 81.9 ML/MIN/1.73
ERYTHROCYTE [DISTWIDTH] IN BLOOD BY AUTOMATED COUNT: 16.5 % (ref 12.3–15.4)
FERRITIN SERPL-MCNC: 47.9 NG/ML (ref 13–150)
FOLATE SERPL-MCNC: 12.1 NG/ML (ref 4.78–24.2)
FSH SERPL-ACNC: 6.2 MIU/ML
GLOBULIN SER CALC-MCNC: 3.5 GM/DL
GLUCOSE SERPL-MCNC: 128 MG/DL (ref 65–99)
HBA1C MFR BLD: 6.5 % (ref 4.8–5.6)
HCT VFR BLD AUTO: 38.3 % (ref 34–46.6)
HCV IGG SERPL QL IA: NON REACTIVE
HDLC SERPL-MCNC: 37 MG/DL (ref 40–60)
HGB BLD-MCNC: 11.4 G/DL (ref 12–15.9)
IRON SATN MFR SERPL: 5 % (ref 20–50)
IRON SERPL-MCNC: 21 MCG/DL (ref 37–145)
LDLC SERPL CALC-MCNC: 125 MG/DL (ref 0–100)
MCH RBC QN AUTO: 22.4 PG (ref 26.6–33)
MCHC RBC AUTO-ENTMCNC: 29.8 G/DL (ref 31.5–35.7)
MCV RBC AUTO: 75.4 FL (ref 79–97)
PLATELET # BLD AUTO: 492 10*3/MM3 (ref 140–450)
POTASSIUM SERPL-SCNC: 4.1 MMOL/L (ref 3.5–5.2)
PROLACTIN SERPL-MCNC: 30.7 NG/ML (ref 4.8–33.4)
PROT SERPL-MCNC: 7.5 G/DL (ref 6–8.5)
RBC # BLD AUTO: 5.08 10*6/MM3 (ref 3.77–5.28)
REF LAB TEST METHOD: NORMAL
SODIUM SERPL-SCNC: 136 MMOL/L (ref 136–145)
TESTOST FREE SERPL-MCNC: 3.8 PG/ML (ref 0–4.2)
TESTOST SERPL-MCNC: 16.1 NG/DL (ref 10–55)
TIBC SERPL-MCNC: 405 MCG/DL
TRIGL SERPL-MCNC: 124 MG/DL (ref 0–150)
TSH SERPL DL<=0.005 MIU/L-ACNC: 0.86 UIU/ML (ref 0.27–4.2)
UIBC SERPL-MCNC: 384 MCG/DL (ref 112–346)
VIT B12 SERPL-MCNC: 367 PG/ML (ref 211–946)
VLDLC SERPL CALC-MCNC: 23 MG/DL (ref 5–40)
WBC # BLD AUTO: 8.74 10*3/MM3 (ref 3.4–10.8)

## 2025-01-30 NOTE — PROGRESS NOTES
January 30, 2025    Hello, may I speak with Sophie Dawkins?    My name is ALVINO CHANG     I am  with [facility]  [address]  [facility] [address] [phone].    Before we get started may I verify your date of birth? 1990    I am calling to officially welcome you to our practice and ask about your recent visit. Is this a good time to talk? yes    Tell me about your visit with us. What things went well?  EVERYTHING WENT GREAT.       We're always looking for ways to make our patients' experiences even better. Do you have recommendations on ways we may improve?  no    Overall were you satisfied with your first visit to our practice? yes       I appreciate you taking the time to speak with me today. Is there anything else I can do for you? no      Thank you, and have a great day.

## 2025-01-31 ENCOUNTER — TELEPHONE (OUTPATIENT)
Dept: SURGERY | Facility: CLINIC | Age: 35
End: 2025-01-31
Payer: COMMERCIAL

## 2025-01-31 NOTE — TELEPHONE ENCOUNTER
Called to update patient on pathology results from recent anterior thigh biopsy. Biopsy was positive for polymorphous lymphocytes and granulomatous inflammation, negative for metastatic carcinoma. I think her clinical picture is consistent with an infected lymph node. She started Bactrim on Wednesday and will complete a 7 day course. If the area does not respond to antibiotics then we may need to proceed with excision next week to definitely exclude a low grade lymphoma or something more concerning. Culture from purulent fluid aspirated is still pending. Patient states she will call next week to update on status and make a follow up appointment if needed. All questions were answered.

## 2025-02-03 ENCOUNTER — PATIENT OUTREACH (OUTPATIENT)
Dept: CASE MANAGEMENT | Facility: OTHER | Age: 35
End: 2025-02-03
Payer: COMMERCIAL

## 2025-02-03 DIAGNOSIS — D50.8 OTHER IRON DEFICIENCY ANEMIA: ICD-10-CM

## 2025-02-03 DIAGNOSIS — E11.65 UNCONTROLLED DIABETES MELLITUS WITH HYPERGLYCEMIA, WITHOUT LONG-TERM CURRENT USE OF INSULIN: Primary | ICD-10-CM

## 2025-02-03 NOTE — OUTREACH NOTE
AMBULATORY CASE MANAGEMENT NOTE    Names and Relationships of Patient/Support Persons: Contact: Sophie Dawkins; Relationship: Self -     Send Education    AMB CM to mail the following education from [employer] for self management of diabetes; Prediabetes and Diabetes, Planning Healthy Meals, Building a Balanced Diet, Dining Out With Diabetes, Reading a nutrition facts label, Foot care for people with diabetes, Meal Planning and carb counting, Living with Diabetes and Diabetes and You to the confirmed address on file.    Jennifer CARVAJAL  Ambulatory Case Management    2/3/2025, 16:54 EST

## 2025-02-03 NOTE — PROGRESS NOTES
Patient: Sophie Dawkins  YOB: 1990    Date: 02/04/2025    Primary Care Provider: Shante Grey APRN    Chief Complaint   Patient presents with    Follow-up     FNA of right anterior thigh lesion       History: I saw the patient in the office today as a followup from their recent FNA of right anterior thigh lesion. Pathology was reviewed and indicated polymorphous lymphocytes and necrotizing granulomatous inflammation. Negative for metastatic carcinoma. These could indicate benign reactive lymphadenopathy, however, if it enlarges or fails to improve with antibiotics excision was recommended. Patient was started on Bactrim last week, she thought initially it was improving, but over the weekend her dog jumped on it and it worsened. It is now more red, tender, hot, and raised. No drainage.    The following portions of the patient's history were reviewed and updated as appropriate: allergies, current medications, past family history, past medical history, past social history, past surgical history and problem list.      Review of Systems:  Constitutional:  Negative for chills, fever, and unexpected weight change.  HENT: Negative for trouble swallowing and voice change.  Eyes:  Negative for visual disturbance.  Respiratory:  Negative for apnea, cough, chest tightness, shortness of breath, and wheezing.  Cardiovascular:  Negative for chest pain, palpitations, and leg swelling.  Gastrointestinal:  Negative for abdominal distention, abdominal pain, anal bleeding, blood in stool, constipation, diarrhea, nausea, rectal pain, and vomiting.  Musculoskeletal:  Negative for back pain, gait problem, and joint swelling.  Skin:  Right thigh abscess/mass  Neurological:  Negative for dizziness, syncope, speech difficulty, weakness, numbness, and headaches.  Hematological:  Negative for adenopathy.  Does not bruise/bleed easily.  Psychiatric/Behavioral:  Negative for confusion.  The patient is not  "nervous/anxious.      Vital Signs  Vitals:    02/04/25 0939   BP: 114/70   Pulse: 98   Resp: 18   Temp: 97.7 °F (36.5 °C)   TempSrc: Temporal   SpO2: 100%   Weight: 81.8 kg (180 lb 6.4 oz)   Height: 165.1 cm (65\")       Allergies:  No Known Allergies    Medications:    Current Outpatient Medications:     famotidine (PEPCID) 10 MG tablet, Take 1 tablet by mouth 2 (Two) Times a Day., Disp: , Rfl:     ferrous sulfate 325 (65 FE) MG tablet, Take 1 tablet by mouth Daily With Breakfast., Disp: 90 tablet, Rfl: 0    multivitamin (MULTI VITAMIN PO), Take  by mouth Daily. Stephanie Mariscal's multivitamin + hair growth, Disp: , Rfl:     mupirocin (BACTROBAN) 2 % ointment, Apply 1 Application topically to the appropriate area as directed 3 (Three) Times a Day., Disp: 30 g, Rfl: 0    sulfamethoxazole-trimethoprim (Bactrim DS) 800-160 MG per tablet, Take 1 tablet by mouth 2 (Two) Times a Day., Disp: 14 tablet, Rfl: 0    vitamin D (ERGOCALCIFEROL) 1.25 MG (81580 UT) capsule capsule, Take 1 capsule by mouth 1 (One) Time Per Week., Disp: 12 capsule, Rfl: 0    Physical Exam:     General Appearance:    Alert, cooperative, in no acute distress   Head:    Normocephalic, without obvious abnormality, atraumatic   Eyes:            Lids and lashes normal, conjunctivae and sclerae normal, no   icterus, no pallor, corneas clear, PERRLA   Throat:   No oral lesions, no thrush, oral mucosa moist   Lungs:     Clear to auscultation,respirations regular, even and                  unlabored    Heart:    Regular rhythm and normal rate, normal S1 and S2, no            murmur, no gallop, no rub, no click   Abdomen:     Normal bowel sounds, no masses, no organomegaly, soft        non-tender, non-distended, no guarding   Extremities:   Moves all extremities well, no edema, no cyanosis, no             redness   Pulses:   Pulses palpable and equal bilaterally   Skin:   Right anteromedial thigh swelling, tender to palpation, now raised and more erythematous, firm, " no drainage   Neurologic:   Cranial nerves 2 - 12 grossly intact, sensation intact, DTR       present and equal bilaterally        Results Review:   I reviewed the patient's new clinical results.     Review of Systems was reviewed and confirmed as accurate as documented by the MA.    ASSESSMENT/PLAN:    1. Mass of right thigh    2. Abscess of right thigh      Patient was seen today as a follow up from right anterior thigh mass/infection. We reviewed her pathology in office today. Patient has continued to take Bactrim since last week. On exam, the area has worsened despite antibiotics. I recommend proceeding with incision and drainage with excision of infected node if indicated. The procedure and risks including bleeding, infection, damage to surrounding structures, need for additional procedures. The patient expresses understanding and wishes to proceed. All of her questions were answered. Wound instructions were given. Still awaiting cultures from aspiration last week.     Electronically signed by Franny Cornejo DO  02/04/25    Procedure:     I recommended abscess drainage to the patient. I explained the indication as well as the risks and benefits which include bleeding, further infection requiring additional procedures, non healing of the wound etc. The patient understands these and wishes to proceed.    The patient was brought to the procedure room. Consent and time out were performed. The area was prepped and draped in the usual fashion. 1% lidocaine with epinephrine was infused locally. The abscess was then incised and drained sharply with a #11 blade. Purulent contents were evacuated and irrigated with saline and peroxide. Inspection of the cavity noted fat necrosis, biopsies of this tissue were taken and sent to pathology. Ultrasound guidance was used to visualize the enlarged node which was quite deep in the patient's thigh. Patient was having quite a bit of discomfort with deeper dissection, therefore,  the decision was made to proceed with ultrasound guided core needle biopsy. Two core needle biopsies were taken and sent to pathology. Minimal blood loss had occurred and was well controlled with pressure. One inch packing was placed in the wound. There were no complications and the patient tolerated the procedure well. Wound instructions were given. I have instructed her to change the packing daily.

## 2025-02-04 ENCOUNTER — OFFICE VISIT (OUTPATIENT)
Dept: SURGERY | Facility: CLINIC | Age: 35
End: 2025-02-04
Payer: COMMERCIAL

## 2025-02-04 VITALS
TEMPERATURE: 97.7 F | OXYGEN SATURATION: 100 % | HEIGHT: 65 IN | HEART RATE: 98 BPM | RESPIRATION RATE: 18 BRPM | BODY MASS INDEX: 30.06 KG/M2 | WEIGHT: 180.4 LBS | DIASTOLIC BLOOD PRESSURE: 70 MMHG | SYSTOLIC BLOOD PRESSURE: 114 MMHG

## 2025-02-04 DIAGNOSIS — R22.41 MASS OF RIGHT THIGH: Primary | ICD-10-CM

## 2025-02-04 DIAGNOSIS — L02.415 ABSCESS OF RIGHT THIGH: ICD-10-CM

## 2025-02-04 PROCEDURE — 10061 I&D ABSCESS COMP/MULTIPLE: CPT | Performed by: STUDENT IN AN ORGANIZED HEALTH CARE EDUCATION/TRAINING PROGRAM

## 2025-02-04 PROCEDURE — 76942 ECHO GUIDE FOR BIOPSY: CPT | Performed by: STUDENT IN AN ORGANIZED HEALTH CARE EDUCATION/TRAINING PROGRAM

## 2025-02-04 NOTE — PROGRESS NOTES
Patient: Sophie Dawkins  YOB: 1990    Date: 02/07/2025    Primary Care Provider: Shante Grey APRN    Chief Complaint   Patient presents with    Follow-up     incision and drainage with two core needle biopsies        History: The patient is in the office today in follow up from incision and drainage with two core needle biopsies. 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 No organisms identified on the GMS or AFB stains, the controls are adequate. Suggestive of bartonella infection. Patient states that the pain and swelling has improved. She finished 7 days of Bactrim yesterday.     The following portions of the patient's history were reviewed and updated as appropriate: allergies, current medications, past family history, past medical history, past social history, past surgical history and problem list.      Review of Systems:  Constitutional:  Negative for chills, fever, and unexpected weight change.  HENT: Negative for trouble swallowing and voice change.  Eyes:  Negative for visual disturbance.  Respiratory:  Negative for apnea, cough, chest tightness, shortness of breath, and wheezing.  Cardiovascular:  Negative for chest pain, palpitations, and leg swelling.  Gastrointestinal:  Negative for abdominal distention, abdominal pain, anal bleeding, blood in stool, constipation, diarrhea, nausea, rectal pain, and vomiting.  Musculoskeletal:  Negative for back pain, gait problem, and joint swelling.  Skin:  Negative for color change, rash, and wound  Neurological:  Negative for dizziness, syncope, speech difficulty, weakness, numbness, and headaches.  Hematological:  Negative for adenopathy.  Does not bruise/bleed easily.  Psychiatric/Behavioral:  Negative for confusion.  The patient is not nervous/anxious.      Vital Signs  Vitals:    02/07/25 0906   BP: 122/68   Pulse: 79   Resp: 18   Temp: 97.7 °F (36.5 °C)   TempSrc: Temporal   SpO2: 99%   Weight: 82.1 kg (181  "lb)   Height: 165.1 cm (65\")       Allergies:  No Known Allergies    Medications:    Current Outpatient Medications:     famotidine (PEPCID) 10 MG tablet, Take 1 tablet by mouth 2 (Two) Times a Day., Disp: , Rfl:     ferrous sulfate 325 (65 FE) MG tablet, Take 1 tablet by mouth Daily With Breakfast., Disp: 90 tablet, Rfl: 0    multivitamin (MULTI VITAMIN PO), Take  by mouth Daily. Stephanie Mariscal's multivitamin + hair growth, Disp: , Rfl:     mupirocin (BACTROBAN) 2 % ointment, Apply 1 Application topically to the appropriate area as directed 3 (Three) Times a Day., Disp: 30 g, Rfl: 0    vitamin D (ERGOCALCIFEROL) 1.25 MG (42004 UT) capsule capsule, Take 1 capsule by mouth 1 (One) Time Per Week., Disp: 12 capsule, Rfl: 0    sulfamethoxazole-trimethoprim (Bactrim DS) 800-160 MG per tablet, Take 1 tablet by mouth 2 (Two) Times a Day. (Patient not taking: Reported on 2/7/2025), Disp: 14 tablet, Rfl: 0    sulfamethoxazole-trimethoprim (Bactrim DS) 800-160 MG per tablet, Take 1 tablet by mouth 2 (Two) Times a Day., Disp: 6 tablet, Rfl: 0    Physical Exam:     General Appearance:    Alert, cooperative, in no acute distress   Head:    Normocephalic, without obvious abnormality, atraumatic   Eyes:            Lids and lashes normal, conjunctivae and sclerae normal, no   icterus, no pallor, corneas clear, PERRLA   Throat:   No oral lesions, no thrush, oral mucosa moist   Lungs:     Clear to auscultation,respirations regular, even and                  unlabored    Heart:    Regular rhythm and normal rate, normal S1 and S2, no            murmur, no gallop, no rub, no click   Abdomen:     Normal bowel sounds, no masses, no organomegaly, soft        non-tender, non-distended, no guarding   Extremities:   Moves all extremities well, no edema, no cyanosis, no             redness   Pulses:   Pulses palpable and equal bilaterally   Skin:   Right anterior thigh incision is open and clean, granulation tissue at the base, mild surrounding " erythema at the opening, no evidence of residual infection   Neurologic:   Cranial nerves 2 - 12 grossly intact, sensation intact, DTR       present and equal bilaterally        Results Review:   I reviewed the patient's new clinical results.     Review of Systems was reviewed and confirmed as accurate as documented by the MA.    ASSESSMENT/PLAN:    1. Lymphadenitis    2. Bartonella infection       Patient was seen today as a follow up from incision and drainage of thigh abscess with core needle biopsy of enlarged lymph node. Pathology likely consistent with bartonella infection. Patient has completed 7 days of Bactrim, will add an additional 3 days. Her packing was removed and replaced today. The wound is clean and infection is much improved. I did repeat a culture today as culture from last week was very non-specific. Patient can remove the packing in 24-48 hours. I informed her that it will need to stay open to drain and will heal from the inside out. Cleanse daily with gentle antibacterial soap and replace clean dressing. If patient has any questions or concerns she should call the office or send me a [employer] message. She expresses understanding and all questions were answered.     Electronically signed by Franny Cornejo DO  02/07/25

## 2025-02-06 LAB — REF LAB TEST METHOD: NORMAL

## 2025-02-07 ENCOUNTER — OFFICE VISIT (OUTPATIENT)
Dept: SURGERY | Facility: CLINIC | Age: 35
End: 2025-02-07
Payer: COMMERCIAL

## 2025-02-07 ENCOUNTER — TELEPHONE (OUTPATIENT)
Dept: SURGERY | Facility: CLINIC | Age: 35
End: 2025-02-07

## 2025-02-07 VITALS
OXYGEN SATURATION: 99 % | TEMPERATURE: 97.7 F | WEIGHT: 181 LBS | SYSTOLIC BLOOD PRESSURE: 122 MMHG | BODY MASS INDEX: 30.16 KG/M2 | RESPIRATION RATE: 18 BRPM | HEART RATE: 79 BPM | DIASTOLIC BLOOD PRESSURE: 68 MMHG | HEIGHT: 65 IN

## 2025-02-07 DIAGNOSIS — I88.9 LYMPHADENITIS: Primary | ICD-10-CM

## 2025-02-07 DIAGNOSIS — A44.9 BARTONELLA INFECTION: ICD-10-CM

## 2025-02-07 RX ORDER — SULFAMETHOXAZOLE AND TRIMETHOPRIM 800; 160 MG/1; MG/1
1 TABLET ORAL 2 TIMES DAILY
Qty: 6 TABLET | Refills: 0 | Status: SHIPPED | OUTPATIENT
Start: 2025-02-07

## 2025-02-11 LAB — REF LAB TEST METHOD: NORMAL

## 2025-02-12 LAB
BACTERIA SPEC AEROBE CULT: NORMAL
BACTERIA SPEC ANAEROBE CULT: NORMAL
BACTERIA SPEC CULT: NORMAL
BACTERIA SPEC CULT: NORMAL

## 2025-02-14 ENCOUNTER — TELEPHONE (OUTPATIENT)
Dept: SURGERY | Facility: CLINIC | Age: 35
End: 2025-02-14
Payer: COMMERCIAL

## 2025-02-14 NOTE — TELEPHONE ENCOUNTER
CALLED [facility] FOR PATIENT TO MAKE A FOLLOW UP APPOINTMENT PER DR TUBBS'S REQUEST.

## 2025-02-14 NOTE — PROGRESS NOTES
"Patient: Sophie Dawkins  YOB: 1990    Date: 02/18/2025    Primary Care Provider: Shante Grey APRN    Chief Complaint   Patient presents with    Follow-up     thigh abscess       History: The patient is in the office today in follow up of thigh abscess. Incision and drainage was performed on 2/7/25. Pathology did show organized fat necrosis with hemorrhage and abscess formation. Negative for granulomas or malignancy. 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 No organisms identified on the GMS or AFB stains, the controls are adequate. Negative for malignancy. She states that the wound is healing with \"some really hard areas around the incision opening\". Minimal drainage. Pain has much improved. She is continuing with local wound care in the area.     The following portions of the patient's history were reviewed and updated as appropriate: allergies, current medications, past family history, past medical history, past social history, past surgical history and problem list.      Review of Systems:  Constitutional:  Negative for chills, fever, and unexpected weight change.  HENT: Negative for trouble swallowing and voice change.  Eyes:  Negative for visual disturbance.  Respiratory:  Negative for apnea, cough, chest tightness, shortness of breath, and wheezing.  Cardiovascular:  Negative for chest pain, palpitations, and leg swelling.  Gastrointestinal:  Negative for abdominal distention, abdominal pain, anal bleeding, blood in stool, constipation, diarrhea, nausea, rectal pain, and vomiting.  Musculoskeletal:  Negative for back pain, gait problem, and joint swelling.  Skin:  Positive for wound  Neurological:  Negative for dizziness, syncope, speech difficulty, weakness, numbness, and headaches.  Hematological:  Negative for adenopathy.  Does not bruise/bleed easily.  Psychiatric/Behavioral:  Negative for confusion.  The patient is not nervous/anxious.      Vital " "Signs  Vitals:    02/18/25 0900   BP: 128/64   Pulse: 84   Resp: 18   Temp: 98.7 °F (37.1 °C)   TempSrc: Temporal   SpO2: 98%   Weight: 83.8 kg (184 lb 12.8 oz)   Height: 165.1 cm (65\")       Allergies:  No Known Allergies    Medications:    Current Outpatient Medications:     famotidine (PEPCID) 10 MG tablet, Take 1 tablet by mouth 2 (Two) Times a Day., Disp: , Rfl:     ferrous sulfate 325 (65 FE) MG tablet, Take 1 tablet by mouth Daily With Breakfast., Disp: 90 tablet, Rfl: 0    multivitamin (MULTI VITAMIN PO), Take  by mouth Daily. Stephanie Mariscal's multivitamin + hair growth, Disp: , Rfl:     vitamin D (ERGOCALCIFEROL) 1.25 MG (53983 UT) capsule capsule, Take 1 capsule by mouth 1 (One) Time Per Week., Disp: 12 capsule, Rfl: 0    mupirocin (BACTROBAN) 2 % ointment, Apply 1 Application topically to the appropriate area as directed 3 (Three) Times a Day., Disp: 30 g, Rfl: 0    sulfamethoxazole-trimethoprim (Bactrim DS) 800-160 MG per tablet, Take 1 tablet by mouth 2 (Two) Times a Day. (Patient not taking: Reported on 2/7/2025), Disp: 14 tablet, Rfl: 0    sulfamethoxazole-trimethoprim (Bactrim DS) 800-160 MG per tablet, Take 1 tablet by mouth 2 (Two) Times a Day., Disp: 6 tablet, Rfl: 0    Physical Exam:     General Appearance:    Alert, cooperative, in no acute distress   Head:    Normocephalic, without obvious abnormality, atraumatic   Eyes:            Lids and lashes normal, conjunctivae and sclerae normal, no   icterus, no pallor, corneas clear, PERRLA   Throat:   No oral lesions, no thrush, oral mucosa moist   Lungs:     Clear to auscultation,respirations regular, even and                  unlabored    Heart:    Regular rhythm and normal rate, normal S1 and S2, no            murmur, no gallop, no rub, no click   Abdomen:     Normal bowel sounds, no masses, no organomegaly, soft        non-tender, non-distended, no guarding   Extremities:   Moves all extremities well, no edema, no cyanosis, no             redness " "  Pulses:   Pulses palpable and equal bilaterally   Skin:   Right anterior thigh incision is open, healthy granulation tissue in the wound, no evidence of residual infection, some subcutaneous \"firmness\" may be residual enlarged lymph node   Neurologic:   Cranial nerves 2 - 12 grossly intact, sensation intact, DTR       present and equal bilaterally        Results Review:   I reviewed the patient's new clinical results.     Review of Systems was reviewed and confirmed as accurate as documented by the MA.    ASSESSMENT/PLAN:    1. Bartonella infection    2. Lymphadenitis    3. Status post incision and drainage       Patient was seen today as a follow up from incision and drainage from infected lymph node. The wound is still open and draining serous fluid. It appears to be healthy with no evidence of residual infection. I discussed continuing with local wound care in the area. I will see her in 2 weeks and we will repeat an ultrasound at that time to see if she still has lymphadenopathy. If it is still present, may proceed with lymph node excision in the operating room. Patient is agreeable to this plan and all of her questions were answered today.     Electronically signed by Franny Cornejo DO  02/18/25      "

## 2025-02-18 ENCOUNTER — OFFICE VISIT (OUTPATIENT)
Dept: SURGERY | Facility: CLINIC | Age: 35
End: 2025-02-18
Payer: COMMERCIAL

## 2025-02-18 ENCOUNTER — PATIENT OUTREACH (OUTPATIENT)
Dept: CASE MANAGEMENT | Facility: OTHER | Age: 35
End: 2025-02-18
Payer: COMMERCIAL

## 2025-02-18 VITALS
SYSTOLIC BLOOD PRESSURE: 128 MMHG | BODY MASS INDEX: 30.79 KG/M2 | OXYGEN SATURATION: 98 % | HEART RATE: 84 BPM | WEIGHT: 184.8 LBS | RESPIRATION RATE: 18 BRPM | TEMPERATURE: 98.7 F | DIASTOLIC BLOOD PRESSURE: 64 MMHG | HEIGHT: 65 IN

## 2025-02-18 DIAGNOSIS — A44.9 BARTONELLA INFECTION: Primary | ICD-10-CM

## 2025-02-18 DIAGNOSIS — Z98.890 STATUS POST INCISION AND DRAINAGE: ICD-10-CM

## 2025-02-18 DIAGNOSIS — D50.8 OTHER IRON DEFICIENCY ANEMIA: ICD-10-CM

## 2025-02-18 DIAGNOSIS — E11.65 UNCONTROLLED DIABETES MELLITUS WITH HYPERGLYCEMIA, WITHOUT LONG-TERM CURRENT USE OF INSULIN: Primary | ICD-10-CM

## 2025-02-18 DIAGNOSIS — I88.9 LYMPHADENITIS: ICD-10-CM

## 2025-02-18 PROCEDURE — 99213 OFFICE O/P EST LOW 20 MIN: CPT | Performed by: STUDENT IN AN ORGANIZED HEALTH CARE EDUCATION/TRAINING PROGRAM

## 2025-02-18 NOTE — OUTREACH NOTE
AMBULATORY CASE MANAGEMENT NOTE    Names and Relationships of Patient/Support Persons: Contact: Sophie Dawkins; Relationship: Self -     Patient Outreach    AMB CM outreach with Patient. Reviewed recent changes to medical status and followed by general surgery. She did not have any current questions related to prior education. Reviewed how to contact CM for additional questions. Reinforced when to contact office or seek EMS. Next outreach scheduled.    Jennifer CARVAJAL  Ambulatory Case Management    2/18/2025, 11:39 EST

## 2025-02-28 ENCOUNTER — OFFICE VISIT (OUTPATIENT)
Dept: SURGERY | Facility: CLINIC | Age: 35
End: 2025-02-28
Payer: COMMERCIAL

## 2025-02-28 VITALS
OXYGEN SATURATION: 98 % | BODY MASS INDEX: 30.32 KG/M2 | TEMPERATURE: 98.1 F | HEIGHT: 65 IN | HEART RATE: 74 BPM | SYSTOLIC BLOOD PRESSURE: 124 MMHG | DIASTOLIC BLOOD PRESSURE: 86 MMHG | WEIGHT: 182 LBS

## 2025-02-28 DIAGNOSIS — I88.9 LYMPHADENITIS: Primary | ICD-10-CM

## 2025-02-28 RX ORDER — SULFAMETHOXAZOLE AND TRIMETHOPRIM 800; 160 MG/1; MG/1
1 TABLET ORAL 2 TIMES DAILY
Qty: 14 TABLET | Refills: 0 | Status: SHIPPED | OUTPATIENT
Start: 2025-02-28 | End: 2025-03-03 | Stop reason: SDUPTHER

## 2025-02-28 NOTE — PROGRESS NOTES
Patient: Sophie Dawkins  YOB: 1990    Date: 03/03/2025    Primary Care Provider: Shante Grey APRN    Chief Complaint   Patient presents with    Follow-up     thigh abscess       History: The patient is in the office today in follow up of thigh abscess. Incision and drainage was performed on 2/7/25. Pathology did show organized fat necrosis with hemorrhage and abscess formation. Negative for granulomas or malignancy. 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 No organisms identified on the GMS or AFB stains, the controls are adequate. Negative for malignancy.     Patient did have a new red spot pop up medial to her incision. We started her on antibiotics again last week. The redness seems to have worsened, but the underlying firmness has improved. No drainage. Patient here today for ultrasound to evaluate lymphadenopathy.    The following portions of the patient's history were reviewed and updated as appropriate: allergies, current medications, past family history, past medical history, past social history, past surgical history and problem list.      Review of Systems:  Constitutional:  Negative for chills, fever, and unexpected weight change.  HENT: Negative for trouble swallowing and voice change.  Eyes:  Negative for visual disturbance.  Respiratory:  Negative for apnea, cough, chest tightness, shortness of breath, and wheezing.  Cardiovascular:  Negative for chest pain, palpitations, and leg swelling.  Gastrointestinal:  Negative for abdominal distention, abdominal pain, anal bleeding, blood in stool, constipation, diarrhea, nausea, rectal pain, and vomiting.  Musculoskeletal:  Negative for back pain, gait problem, and joint swelling.  Skin:  Right left redness and swelling.  Neurological:  Negative for dizziness, syncope, speech difficulty, weakness, numbness, and headaches.  Hematological:  Negative for adenopathy.  Does not bruise/bleed  "easily.  Psychiatric/Behavioral:  Negative for confusion.  The patient is not nervous/anxious.    Vital Signs  Vitals:    03/03/25 1344   BP: 122/68   Pulse: 89   Resp: 18   Temp: 97.5 °F (36.4 °C)   TempSrc: Temporal   SpO2: 99%   Weight: 82.6 kg (182 lb)   Height: 165.1 cm (65\")       Allergies:  No Known Allergies    Medications:    Current Outpatient Medications:     famotidine (PEPCID) 10 MG tablet, Take 1 tablet by mouth 2 (Two) Times a Day., Disp: , Rfl:     ferrous sulfate 325 (65 FE) MG tablet, Take 1 tablet by mouth Daily With Breakfast., Disp: 90 tablet, Rfl: 0    multivitamin (MULTI VITAMIN PO), Take  by mouth Daily. Stephanie Mariscal's multivitamin + hair growth, Disp: , Rfl:     sulfamethoxazole-trimethoprim (Bactrim DS) 800-160 MG per tablet, Take 1 tablet by mouth 2 (Two) Times a Day., Disp: 14 tablet, Rfl: 0    vitamin D (ERGOCALCIFEROL) 1.25 MG (10561 UT) capsule capsule, Take 1 capsule by mouth 1 (One) Time Per Week., Disp: 12 capsule, Rfl: 0    Physical Exam:     General Appearance:    Alert, cooperative, in no acute distress   Head:    Normocephalic, without obvious abnormality, atraumatic   Eyes:            Lids and lashes normal, conjunctivae and sclerae normal, no   icterus, no pallor, corneas clear, PERRLA   Throat:   No oral lesions, no thrush, oral mucosa moist   Lungs:     Clear to auscultation,respirations regular, even and                  unlabored    Heart:    Regular rhythm and normal rate, normal S1 and S2, no            murmur, no gallop, no rub, no click   Abdomen:     Normal bowel sounds, no masses, no organomegaly, soft        non-tender, non-distended, no guarding   Extremities:  Right leg incision is open and clean, appears to be well healing. More medial to this is an approximately 1 cm raised erythematous lesion, non-tender, no drainage. Underlying tissue firmness has improved.   Pulses:   Pulses palpable and equal bilaterally   Skin:   No bleeding, bruising or rash   Neurologic:   " Cranial nerves 2 - 12 grossly intact, sensation intact, DTR       present and equal bilaterally        Results Review:   I reviewed the patient's new clinical results.     Review of Systems was reviewed and confirmed as accurate as documented by the MA.    ASSESSMENT/PLAN:    1. Lymphadenitis       Patient in office today for repeat of her right thigh ultrasound. Her initial incision and drainage site is well healing, however, she has had a new lesion pop up medial to this area. There has not been drainage. We did start her on Bactrim last week and she states the underlying firmness/swelling has improved. Office ultrasound today did demonstrate another small fluid collection in the new area of redness in addition to the 3 enlarged lymph nodes. I recommend proceeding with incision and drainage in office today. We will also extend her Bactrim prescription out for a total of 14 days. I will see her again March 24 to evaluate her after her course of antibiotics. We may proceed with another ultrasound at this time. Patient is agreeable to the plan and all of her questions were answered.     Electronically signed by Franny Cornejo DO  03/03/25    Procedure:     I recommended abscess drainage to the patient. I explained the indication as well as the risks and benefits which include bleeding, further infection requiring additional procedures, non healing of the wound etc. The patient understands these and wishes to proceed.    The patient was brought to the procedure room. Consent and time out were performed. The area was prepped and draped in the usual fashion. 1% lidocaine with epinephrine was infused locally. The abscess was then incised and drained sharply with a #11 blade. Purulent contents were evacuated and irrigated with saline and peroxide. Packing was placed. Minimal blood loss had occurred and was well controlled with pressure. There were no complications and the patient tolerated the procedure well. Wound  instructions were given.

## 2025-02-28 NOTE — PROGRESS NOTES
Patient: Sophie Dawkins  YOB: 1990    Date: 02/28/2025    Primary Care Provider: Shante Grey APRN    Chief Complaint   Patient presents with    Follow-up     Thigh abscess          History: The patient is in the office today in follow up of thigh abscess. Incision and drainage was performed on 2/7/25. Pathology did show organized fat necrosis with hemorrhage and abscess formation. Negative for granulomas or malignancy. 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 No organisms identified on the GMS or AFB stains, the controls are adequate. Negative for malignancy. She states that she has a new swollen bump next to it is getting red. States that initially it was more red and has now isolated to a small spot. She states it is tender.     The following portions of the patient's history were reviewed and updated as appropriate: allergies, current medications, past family history, past medical history, past social history, past surgical history and problem list.    Review of Systems   Constitutional:  Negative for chills, fever and unexpected weight change.   HENT:  Negative for hearing loss, trouble swallowing and voice change.    Eyes:  Negative for visual disturbance.   Respiratory:  Negative for apnea, cough, chest tightness, shortness of breath and wheezing.    Cardiovascular:  Negative for chest pain, palpitations and leg swelling.   Gastrointestinal:  Negative for abdominal distention, abdominal pain, anal bleeding, blood in stool, constipation, diarrhea, nausea, rectal pain and vomiting.   Endocrine: Negative for cold intolerance and heat intolerance.   Genitourinary:  Negative for difficulty urinating, dysuria and flank pain.   Musculoskeletal:  Negative for back pain and gait problem.   Skin:  Negative for color change, rash and wound.   Neurological:  Negative for dizziness, syncope, speech difficulty, weakness, light-headedness, numbness and headaches.   Hematological:  " Negative for adenopathy. Does not bruise/bleed easily.   Psychiatric/Behavioral:  Negative for confusion. The patient is not nervous/anxious.        Vital Signs  Vitals:    02/28/25 0904   BP: 124/86   Pulse: 74   Temp: 98.1 °F (36.7 °C)   SpO2: 98%   Weight: 82.6 kg (182 lb)   Height: 165.1 cm (65\")       Allergies:  No Known Allergies    Medications:    Current Outpatient Medications:     famotidine (PEPCID) 10 MG tablet, Take 1 tablet by mouth 2 (Two) Times a Day., Disp: , Rfl:     ferrous sulfate 325 (65 FE) MG tablet, Take 1 tablet by mouth Daily With Breakfast., Disp: 90 tablet, Rfl: 0    multivitamin (MULTI VITAMIN PO), Take  by mouth Daily. Stephanie Mariscal's multivitamin + hair growth, Disp: , Rfl:     vitamin D (ERGOCALCIFEROL) 1.25 MG (85853 UT) capsule capsule, Take 1 capsule by mouth 1 (One) Time Per Week., Disp: 12 capsule, Rfl: 0    Physical Exam:     General Appearance:    Alert, cooperative, in no acute distress   Head:    Normocephalic, without obvious abnormality, atraumatic   Eyes:            Lids and lashes normal, conjunctivae and sclerae normal, no   icterus, no pallor, corneas clear, PERRLA   Throat:   No oral lesions, no thrush, oral mucosa moist   Lungs:     Clear to auscultation,respirations regular, even and                  unlabored    Heart:    Regular rhythm and normal rate, normal S1 and S2, no            murmur, no gallop, no rub, no click   Abdomen:     Normal bowel sounds, no masses, no organomegaly, soft        non-tender, non-distended, no guarding   Extremities:   Moves all extremities well, no edema, no cyanosis, no             redness   Pulses:   Pulses palpable and equal bilaterally   Skin:   Right thigh incision is open and well healing, more medial pustule/red raised lesion, tender to palpation, overlying existing lymphadenopathy   Neurologic:   Cranial nerves 2 - 12 grossly intact, sensation intact, DTR       present and equal bilaterally          Results " Review:   None     Review of Systems was reviewed and confirmed as accurate as documented by the MA.    ASSESSMENT/PLAN:    1. Lymphadenitis       Patient seen today for a new raised red area medial to her incision and drainage site. She states it has improved since onset, but . On exam, this is a new area separate from her incision. It is still overlying the area of lymphadenopathy however. I recommend starting another round of antibiotics today. I will see her again on Monday to complete an ultrasound to evaluate for underlying fluid collection. I did discuss with patient that if there is not significant improvement, I am going to recommend proceeding to OR for exploration and excision of this area. Patient expresses understanding and all of her questions were answered.     Electronically signed by Franny Cornejo DO  02/28/25

## 2025-03-03 ENCOUNTER — OFFICE VISIT (OUTPATIENT)
Dept: SURGERY | Facility: CLINIC | Age: 35
End: 2025-03-03
Payer: COMMERCIAL

## 2025-03-03 VITALS
HEART RATE: 89 BPM | DIASTOLIC BLOOD PRESSURE: 68 MMHG | TEMPERATURE: 97.5 F | WEIGHT: 182 LBS | HEIGHT: 65 IN | BODY MASS INDEX: 30.32 KG/M2 | SYSTOLIC BLOOD PRESSURE: 122 MMHG | OXYGEN SATURATION: 99 % | RESPIRATION RATE: 18 BRPM

## 2025-03-03 DIAGNOSIS — I88.9 LYMPHADENITIS: Primary | ICD-10-CM

## 2025-03-03 DIAGNOSIS — L02.415 CUTANEOUS ABSCESS OF RIGHT LOWER EXTREMITY: ICD-10-CM

## 2025-03-03 PROCEDURE — 10060 I&D ABSCESS SIMPLE/SINGLE: CPT | Performed by: STUDENT IN AN ORGANIZED HEALTH CARE EDUCATION/TRAINING PROGRAM

## 2025-03-03 PROCEDURE — 99214 OFFICE O/P EST MOD 30 MIN: CPT | Performed by: STUDENT IN AN ORGANIZED HEALTH CARE EDUCATION/TRAINING PROGRAM

## 2025-03-03 RX ORDER — SULFAMETHOXAZOLE AND TRIMETHOPRIM 800; 160 MG/1; MG/1
1 TABLET ORAL 2 TIMES DAILY
Qty: 14 TABLET | Refills: 0 | Status: SHIPPED | OUTPATIENT
Start: 2025-03-03 | End: 2025-03-10

## 2025-03-17 ENCOUNTER — OFFICE VISIT (OUTPATIENT)
Dept: INTERNAL MEDICINE | Facility: CLINIC | Age: 35
End: 2025-03-17
Payer: COMMERCIAL

## 2025-03-17 VITALS
HEART RATE: 73 BPM | WEIGHT: 184 LBS | DIASTOLIC BLOOD PRESSURE: 64 MMHG | SYSTOLIC BLOOD PRESSURE: 120 MMHG | OXYGEN SATURATION: 98 % | BODY MASS INDEX: 30.66 KG/M2 | TEMPERATURE: 98.2 F | HEIGHT: 65 IN

## 2025-03-17 DIAGNOSIS — E55.9 VITAMIN D DEFICIENCY: ICD-10-CM

## 2025-03-17 DIAGNOSIS — L02.415 CUTANEOUS ABSCESS OF RIGHT LOWER EXTREMITY: ICD-10-CM

## 2025-03-17 DIAGNOSIS — I88.9 LYMPHADENITIS: ICD-10-CM

## 2025-03-17 DIAGNOSIS — D50.9 IRON DEFICIENCY ANEMIA, UNSPECIFIED IRON DEFICIENCY ANEMIA TYPE: ICD-10-CM

## 2025-03-17 DIAGNOSIS — R79.89 ELEVATED DHEA: ICD-10-CM

## 2025-03-17 DIAGNOSIS — N92.1 MENORRHAGIA WITH IRREGULAR CYCLE: ICD-10-CM

## 2025-03-17 DIAGNOSIS — A44.9 BARTONELLA INFECTION: ICD-10-CM

## 2025-03-17 DIAGNOSIS — E11.65 TYPE 2 DIABETES MELLITUS WITH HYPERGLYCEMIA, WITHOUT LONG-TERM CURRENT USE OF INSULIN: Primary | ICD-10-CM

## 2025-03-17 PROCEDURE — 99214 OFFICE O/P EST MOD 30 MIN: CPT | Performed by: NURSE PRACTITIONER

## 2025-03-17 RX ORDER — ERGOCALCIFEROL 1.25 MG/1
50000 CAPSULE, LIQUID FILLED ORAL WEEKLY
Qty: 12 CAPSULE | Refills: 0 | Status: SHIPPED | OUTPATIENT
Start: 2025-03-17

## 2025-03-18 ENCOUNTER — PATIENT OUTREACH (OUTPATIENT)
Dept: CASE MANAGEMENT | Facility: OTHER | Age: 35
End: 2025-03-18
Payer: COMMERCIAL

## 2025-03-18 DIAGNOSIS — E11.65 UNCONTROLLED DIABETES MELLITUS WITH HYPERGLYCEMIA, WITHOUT LONG-TERM CURRENT USE OF INSULIN: Primary | ICD-10-CM

## 2025-03-18 DIAGNOSIS — D50.8 OTHER IRON DEFICIENCY ANEMIA: ICD-10-CM

## 2025-03-18 NOTE — OUTREACH NOTE
AMBULATORY CASE MANAGEMENT NOTE    Care Coordination    VINCENT OSEGUERA completed 30 day chart review and no urgent care or emergency department visits noted. Completed follow up with primary care, has recommended follow ups with refills on file. Contact information is known to Patient. She has not contacted VINCENT OSEGUERA in the last 30 days. Will close High Risk Care Management. HRCM: Closed; Patient goal achieved.    Jennifer CARVAJAL  Ambulatory Case Management    3/18/2025, 08:34 EDT

## 2025-03-18 NOTE — PROGRESS NOTES
Office Visit      Date: 2025   Patient Name: Sophie Dawkins  : 1990   MRN: [id number]     Chief Complaint:    Chief Complaint   Patient presents with    Follow-up    Hypertension       History of Present Illness: Sophie Dawkins is a 34 y.o. female presenting to follow up T2DM, FER, elevated DHEA, vitamin D deficiency   Following general surgery for lymphadenitis, cutaneous abscess RLE and bartonella infection, had I&D, currently on bactrim, has follow up next Monday with general surgery for repeat US. May have to have a lymph node removed in right groin if no improvement is noted   Since being diagnosed with T2DM with A1c of 6.5% has cut out sodas, drinking more water, no major diet changes or exercise has been focused more on the groin abscess and getting that treated. Not time to recheck A1c just yet.   FER- taking iron as prescribed, due repeat labs, feeling improvement in fatigue, white tongue coating is gone   Elevated DHEA-sulfate- will repeat. Menstrual cycles are not regular can be off by 1-2 weeks at a time, first 2 days of cycle are very heavy, has hirsutism on chin. Plans to f/u with gyn eventually  Vitamin D deficiency- taking high dose vitamin D supplement once a week    Subjective      Review of Systems:   Pertinent ROS noted in HPI.     I have reviewed the patients family history, social history, past medical history, past surgical history and have updated it as appropriate.     Medications:     Current Outpatient Medications:     vitamin D (ERGOCALCIFEROL) 1.25 MG (07753 UT) capsule capsule, Take 1 capsule by mouth 1 (One) Time Per Week., Disp: 12 capsule, Rfl: 0    ferrous sulfate 325 (65 FE) MG tablet, Take 1 tablet by mouth Daily With Breakfast., Disp: 90 tablet, Rfl: 0    multivitamin (MULTI VITAMIN PO), Take  by mouth Daily. Stephanie Mariscal's multivitamin + hair growth, Disp: , Rfl:     Allergies:   No Known Allergies    Objective     Physical Exam  Vitals and nursing note  "reviewed.   Constitutional:       General: She is not in acute distress.     Appearance: Normal appearance. She is obese.   HENT:      Head: Normocephalic and atraumatic.      Right Ear: External ear normal.      Left Ear: External ear normal.      Nose: Nose normal.      Mouth/Throat:      Lips: Pink.      Mouth: Mucous membranes are moist.      Tongue: No lesions. Tongue does not deviate from midline.      Pharynx: Oropharynx is clear. Uvula midline.   Eyes:      General: No scleral icterus.     Extraocular Movements: Extraocular movements intact.      Conjunctiva/sclera: Conjunctivae normal.      Pupils: Pupils are equal, round, and reactive to light.   Cardiovascular:      Rate and Rhythm: Normal rate and regular rhythm.   Pulmonary:      Effort: Pulmonary effort is normal.      Breath sounds: Normal breath sounds.   Abdominal:      Tenderness: There is no abdominal tenderness.   Musculoskeletal:         General: Normal range of motion.      Cervical back: Normal range of motion and neck supple.   Skin:     General: Skin is warm and dry.   Neurological:      General: No focal deficit present.      Mental Status: She is alert and oriented to person, place, and time. Mental status is at baseline.   Psychiatric:         Mood and Affect: Mood normal.         Behavior: Behavior normal.         Thought Content: Thought content normal.         Vital Signs:   Vitals:    03/17/25 1735   BP: 120/64   Pulse: 73   Temp: 98.2 °F (36.8 °C)   SpO2: 98%   Weight: 83.5 kg (184 lb)   Height: 165.1 cm (65\")     Body mass index is 30.62 kg/m².    Assessment / Plan      Assessment/Plan:   Diagnoses and all orders for this visit:    1. Type 2 diabetes mellitus with hyperglycemia, without long-term current use of insulin (Primary)  -     Microalbumin / Creatinine Urine Ratio - Urine, Clean Catch    2. Iron deficiency anemia, unspecified iron deficiency anemia type    3. Menorrhagia with irregular cycle    4. Elevated DHEA  -     " DHEA-Sulfate    5. Vitamin D deficiency  -     vitamin D (ERGOCALCIFEROL) 1.25 MG (99737 UT) capsule capsule; Take 1 capsule by mouth 1 (One) Time Per Week.  Dispense: 12 capsule; Refill: 0    6. Lymphadenitis    7. Cutaneous abscess of right lower extremity    8. Bartonella infection       T2DM- continue to work on diabetic diet, exercise, avoid sodas, drink >64 fluid oz water. Will recheck A1c in the next couple of months after lifestyle changes. Consider metformin. Get retina exam once a year with optometry. Order microalbumin.  FER- update cbc, iron panel, continue oral iron. Possibly from lack of iron in diet and/or menorrhagia ?  Recheck DHEA; discussed causes of this. Likely PCOS due to irregular menstrual cycles, menorrhagia, hirsutism, and insulin resistance but can get further workup with gyn on later date.   Continue vitamin D once a week  F/u general surgery for follow up US. Finish bactrim as prescribed     Follow Up:   VICKIE AYALA  [facility] - [place]

## 2025-03-19 NOTE — PROGRESS NOTES
Patient: Sophie Dawkins  YOB: 1990    Date: 03/24/2025    Primary Care Provider: Shante Grey APRN    Chief Complaint   Patient presents with    Follow-up     thigh abscess       History: The patient is in the office today in follow up of thigh abscess/lymphadenopathy. Incision and drainage was performed on 2/7/25. Pathology did show organized fat necrosis with hemorrhage and abscess formation. Negative for granulomas or malignancy. 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 No organisms identified on the GMS or AFB stains, the controls are adequate. Negative for malignancy. Patient states that her incisions are healing up. She hasn't had any pain associated with it and the area seems to be softer. Minimal drainage from the incisions.     The following portions of the patient's history were reviewed and updated as appropriate: allergies, current medications, past family history, past medical history, past social history, past surgical history and problem list.      Review of Systems:  Constitutional:  Negative for chills, fever, and unexpected weight change.  HENT: Negative for trouble swallowing and voice change.  Eyes:  Negative for visual disturbance.  Respiratory:  Negative for apnea, cough, chest tightness, shortness of breath, and wheezing.  Cardiovascular:  Negative for chest pain, palpitations, and leg swelling.  Gastrointestinal:  Negative for abdominal distention, abdominal pain, anal bleeding, blood in stool, constipation, diarrhea, nausea, rectal pain, and vomiting.  Musculoskeletal:  Negative for back pain, gait problem, and joint swelling.  Skin:  Negative for color change, rash, and wound  Neurological:  Negative for dizziness, syncope, speech difficulty, weakness, numbness, and headaches.  Hematological:  Negative for adenopathy.  Does not bruise/bleed easily.  Psychiatric/Behavioral:  Negative for confusion.  The patient is not nervous/anxious.    Vital  "Signs  Vitals:    03/24/25 1248   BP: 120/70   Pulse: 75   Resp: 18   Temp: 97.5 °F (36.4 °C)   TempSrc: Temporal   SpO2: 100%   Weight: 84.3 kg (185 lb 14.4 oz)   Height: 165.1 cm (65\")       Allergies:  No Known Allergies    Medications:    Current Outpatient Medications:     ferrous sulfate 325 (65 FE) MG tablet, Take 1 tablet by mouth Daily With Breakfast., Disp: 90 tablet, Rfl: 0    multivitamin (MULTI VITAMIN PO), Take  by mouth Daily. Stephanie Mariscal's multivitamin + hair growth, Disp: , Rfl:     vitamin D (ERGOCALCIFEROL) 1.25 MG (97160 UT) capsule capsule, Take 1 capsule by mouth 1 (One) Time Per Week., Disp: 12 capsule, Rfl: 0    Physical Exam:     General Appearance:    Alert, cooperative, in no acute distress   Head:    Normocephalic, without obvious abnormality, atraumatic   Eyes:            Lids and lashes normal, conjunctivae and sclerae normal, no   icterus, no pallor, corneas clear, PERRLA   Throat:   No oral lesions, no thrush, oral mucosa moist   Lungs:     Clear to auscultation,respirations regular, even and                  unlabored    Heart:    Regular rhythm and normal rate, normal S1 and S2, no            murmur, no gallop, no rub, no click   Abdomen:     Normal bowel sounds, no masses, no organomegaly, soft        non-tender, non-distended, no guarding   Extremities:  Right thigh incisions are mostly closed, well healing, and clean. No evidence of infection. Palpable adenopathy/tissue firmness feels softer.   Pulses:   Pulses palpable and equal bilaterally   Skin:   No bleeding, bruising or rash   Neurologic:   Cranial nerves 2 - 12 grossly intact, sensation intact, DTR       present and equal bilaterally       Results Review:   I reviewed the patient's new clinical results.     Review of Systems was reviewed and confirmed as accurate as documented by the MA.    ASSESSMENT/PLAN:    1. Lymphadenitis       Patient was seen today for follow up of lymphadenitis. She has completed a 14 day course of " Bactrim and reports improvement in the area. Denies tenderness. I do not see any evidence of residual infection. I recommend continuing with ultrasound surveillance of the area. We will repeat her ultrasound today and again take measurements with plan to repeat in about three months. There were 4 prominent lymph nodes measuring 4.8, 2.0, 2.4, and 1.9 cm. If patient develops any acute changes, she knows to call me and I will see her again at any time.     Electronically signed by Franny Cornejo DO  03/24/25       PAST MEDICAL HISTORY:  Mood disorder

## 2025-03-24 ENCOUNTER — OFFICE VISIT (OUTPATIENT)
Dept: SURGERY | Facility: CLINIC | Age: 35
End: 2025-03-24
Payer: COMMERCIAL

## 2025-03-24 VITALS
HEIGHT: 65 IN | SYSTOLIC BLOOD PRESSURE: 120 MMHG | HEART RATE: 75 BPM | OXYGEN SATURATION: 100 % | TEMPERATURE: 97.5 F | RESPIRATION RATE: 18 BRPM | WEIGHT: 185.9 LBS | BODY MASS INDEX: 30.97 KG/M2 | DIASTOLIC BLOOD PRESSURE: 70 MMHG

## 2025-03-24 DIAGNOSIS — I88.9 LYMPHADENITIS: Primary | ICD-10-CM

## 2025-03-24 PROCEDURE — 99213 OFFICE O/P EST LOW 20 MIN: CPT | Performed by: STUDENT IN AN ORGANIZED HEALTH CARE EDUCATION/TRAINING PROGRAM

## 2025-04-01 ENCOUNTER — OFFICE VISIT (OUTPATIENT)
Dept: BEHAVIORAL HEALTH | Facility: CLINIC | Age: 35
End: 2025-04-01
Payer: COMMERCIAL

## 2025-04-01 VITALS
BODY MASS INDEX: 30.84 KG/M2 | HEIGHT: 65 IN | WEIGHT: 185.1 LBS | SYSTOLIC BLOOD PRESSURE: 124 MMHG | DIASTOLIC BLOOD PRESSURE: 76 MMHG

## 2025-04-01 DIAGNOSIS — F34.1 PERSISTENT DEPRESSIVE DISORDER: Primary | ICD-10-CM

## 2025-04-01 DIAGNOSIS — F41.1 GAD (GENERALIZED ANXIETY DISORDER): ICD-10-CM

## 2025-04-01 PROCEDURE — 90792 PSYCH DIAG EVAL W/MED SRVCS: CPT

## 2025-04-01 RX ORDER — DESVENLAFAXINE 50 MG/1
50 TABLET, FILM COATED, EXTENDED RELEASE ORAL DAILY
Qty: 30 TABLET | Refills: 2 | Status: SHIPPED | OUTPATIENT
Start: 2025-04-01

## 2025-04-01 NOTE — PATIENT INSTRUCTIONS
[email or web address]: online directory of therapists    Alivia recommendations:  Merly and [employer]: free library access, including audiobooks and e-books  I Am: affirmations  Balance: mindfulness and meditation  Castillo: mental health alivia for kids and adults (user sets goals/tasks)  Storm: ADHD/mental health alivia for parents and kids (parents set goals/tasks)  Mood Bloom: facilitated thought progression, helps with depression    Bilateral stimulation music: free on youtube and spotify    Book Recommendations:  How To Do The Work, Sydni Pond (follow the Holistic Psychologist)*  Unf**k Your Brain, Karlie Kay*  Self Compassion, Payton Nguyen*  Rushing Woman's Syndrome, Merly Parekh  Emotionally Healthy Spirituality, Gwyn  Try Softer, Linda Roberts*  You Mean I'm Not Lazy, Stupid or Crazy? Ayan  ADHD 2.0, by Enrike ANDERSON For Smart A** Women, Aletha Peterson  
no
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
## 2025-04-09 ENCOUNTER — OFFICE VISIT (OUTPATIENT)
Dept: OBSTETRICS AND GYNECOLOGY | Facility: CLINIC | Age: 35
End: 2025-04-09
Payer: COMMERCIAL

## 2025-04-09 VITALS
WEIGHT: 185 LBS | HEIGHT: 65 IN | BODY MASS INDEX: 30.82 KG/M2 | SYSTOLIC BLOOD PRESSURE: 124 MMHG | DIASTOLIC BLOOD PRESSURE: 66 MMHG

## 2025-04-09 DIAGNOSIS — R73.09 ABNORMAL GLUCOSE: ICD-10-CM

## 2025-04-09 DIAGNOSIS — N92.1 MENORRHAGIA WITH IRREGULAR CYCLE: Primary | ICD-10-CM

## 2025-04-09 DIAGNOSIS — N94.6 DYSMENORRHEA: ICD-10-CM

## 2025-04-09 DIAGNOSIS — D50.9 IRON DEFICIENCY ANEMIA, UNSPECIFIED IRON DEFICIENCY ANEMIA TYPE: ICD-10-CM

## 2025-04-09 DIAGNOSIS — E27.9 ADRENAL ABNORMALITY: ICD-10-CM

## 2025-04-09 NOTE — PROGRESS NOTES
Chief Complaint  Menorrhagia (Follow up discuss abnormal hormone labs, patient complains of heavy and irregular menses. )     History of Present Illness:  Patient is 34 y.o.  who presents to [facility] here as a new patient for evaluation of menorrhagia with irregular cycles.  Patient reports she will have a menses usually every month but it will vary within 1 to 2 weeks in timing.  It usually will last for 1 week.  She will have heavy flow for the first few days.  She will change a pad every hour.  She also will have cramping associated with her menstrual cycles.  She did have recent labs with her primary care provider.  She had an abnormal DHEA-S.  Repeat confirm the same.  She also had low iron levels.  She did start iron supplements with improvement in her blood work.  She was also diagnosed with cat scratch fever around the same time.  Her last menstrual cycle was 1 week ago.  She has had a previous tubal ligation.  She has not had any imaging.  She was not fasting with her labs.  She sees nurse practitioner Shante Grey for her primary care.  She is also currently being followed by psychiatry.    History  Past Medical History:   Diagnosis Date    Abnormal ECG     Doctor stated small heart murmur, but nothing done.    Abnormal Pap smear of cervix 2006    Pre cancerous cells, used in office freeze treatment.    Anxiety     Chronic ear infection     right ear mostly    Diabetes mellitus 23    Controlled.    Headache     Herpes 2018    HPV (human papilloma virus) infection 2018    Previous g[facility]o- [facility] before closing did a test and said i was positive. No more info than that.    Hypertension 23     Tracking for a month.    Multiple gestation ,     2 kids, natural births, tubes tied.    Persistent depressive disorder 2025    Postoperative wound infection 2025    Cat Scratch Fever - Healed    Rh incompatibility     Took shots with both  pregnancies.     Current Outpatient Medications on File Prior to Visit   Medication Sig Dispense Refill    desvenlafaxine (PRISTIQ) 50 MG 24 hr tablet Take 1 tablet by mouth Daily. 30 tablet 2    ferrous sulfate 325 (65 FE) MG tablet Take 1 tablet by mouth Daily With Breakfast. 90 tablet 0    multivitamin (MULTI VITAMIN PO) Take  by mouth Daily. Stephanie Rivera multivitamin + hair growth      vitamin D (ERGOCALCIFEROL) 1.25 MG (22173 UT) capsule capsule Take 1 capsule by mouth 1 (One) Time Per Week. 12 capsule 0     No current facility-administered medications on file prior to visit.     No Known Allergies  Past Surgical History:   Procedure Laterality Date    GYNECOLOGIC CRYOSURGERY      LYMPH NODE BIOPSY      TUBAL ABDOMINAL LIGATION      After second birth of child.    WISDOM TOOTH EXTRACTION       Family History   Problem Relation Age of Onset    Diabetes Father         Takes meds for diabetes.    Depression Father     Hyperlipidemia Father     Hypertension Father     Migraines Father     Diabetes Maternal Grandmother         Takes meds for diabetes.    Hyperlipidemia Maternal Grandmother     Vision loss Maternal Grandmother         Glaucoma / Wet Macular Degeneration    Hypertension Maternal Grandmother     Anxiety disorder Mother     Depression Mother     Hyperlipidemia Mother     Thyroid disease Mother         Grave’s Disease    Hypertension Mother     Heart disease Maternal Grandfather          of heart attack with sepsis.    Hyperlipidemia Maternal Grandfather     Hypertension Maternal Grandfather      Social History     Socioeconomic History    Marital status:    Tobacco Use    Smoking status: Never    Smokeless tobacco: Never   Vaping Use    Vaping status: Never Used   Substance and Sexual Activity    Alcohol use: Not Currently     Comment: Rarely, for special occasions.    Drug use: Never    Sexual activity: Yes     Partners: Male     Birth control/protection: Tubal ligation       Physical  "Examination:  Vital Signs: /66   Ht 165.1 cm (65\")   Wt 83.9 kg (185 lb)   BMI 30.79 kg/m²     General Appearance: alert, appears stated age, and cooperative  Breasts: Not performed.  Abdomen: no masses, no hepatomegaly, no splenomegaly, soft non-tender, no guarding, and no rebound tenderness  Pelvic: Not performed.    Data Review:  The following data was reviewed by: Dede Kinsey MD on 04/09/2025:     Labs:  CBC (No Diff) (01/23/2025 08:11)  Comprehensive Metabolic Panel (01/23/2025 08:11)  Lipid Panel (01/23/2025 08:11)  TSH Rfx On Abnormal To Free T4 (01/23/2025 08:11)  Hemoglobin A1c (01/23/2025 08:11)  Vitamin D,25-Hydroxy (01/23/2025 08:11)  Vitamin B12 (01/23/2025 08:11)  Folate (01/23/2025 08:11)  Hepatitis C Antibody (01/23/2025 08:11)  Iron and TIBC (01/23/2025 08:11)  Ferritin (01/23/2025 08:11)  17-Hydroxyprogesterone (01/23/2025 08:11)  Prolactin (01/23/2025 08:11)  Follicle Stimulating Hormone (01/23/2025 08:11)  Testosterone (Free & Total), LC / MS (01/23/2025 08:11)  DHEA-Sulfate (01/23/2025 08:11)  CBC & Differential (03/24/2025 13:49)  Iron and TIBC (03/24/2025 13:49)  Ferritin (03/24/2025 13:49)  Microalbumin / Creatinine Urine Ratio - (03/24/2025 13:49)  DHEA-Sulfate (03/24/2025 13:49)  Imaging:    Medical Records:  Progress Notes by Franny Cornejo DO (03/24/2025 12:40)   Progress Notes by Shante Grey, APRN (03/17/2025 17:30)   Assessment and Plan   1. Menorrhagia with irregular cycle  The patient was informed that menstrual flow outside of normal volume, duration, regularity, or frequency is considered abnormal uterine bleeding.  The patient was informed that the normal duration of menstrual flow is usually 5 days and the normal cycle typically lasts between 21-35 days.  The patient was informed that heavy menstrual bleeding has been defined as blood loss greater than 80 mL and given that this is hard to quantify excessive blood loss is based on the patient's perception. The " patient was informed that AUB most frequently occurs in women aged 19-39 years as a result of pregnancy, structural lesions such as polyps or fibroids, anovulatory cycles, use of hormonal contraception, and endometrial hyperplasia.  She was counseled that endometrial cancer is less common but may occur.  It is recommended that she have a pregnancy test, CBC, and TSH.  Her pap smear needs to be up to date.  She needs screening for Chlamydia if she feels she is at high risk.  It is also recommended that she have a transvaginal ultrasound for further evaluation.  If anatomic abnormalities are noted then then surgery may be indicated. An endometrial ablation may also be an option to control bleeding in women who have completed childbearing.  The various options were discussed regarding medical management of her bleeding to include the use of nonsteroidal antiinflammatory drugs, progestins, combination oral contraceptives, a levonorgestrel intrauterine device, or tranexamic acid. The patient is informed if there is no improvement in her symptoms with medical management then she will need additional evaluation to include additional laboratory assessment and endometrial sampling.  Labs reviewed as noted.  Patient to return for fasting labs early a.m.  Patient to return for transvaginal ultrasound as discussed.  - CBC & Differential  - US Non-ob Transvaginal; Future    2. Dysmenorrhea  Sophie Dawkins was counseled regarding the various etiologies for dysmenorrhea.  The patient was informed that primary dysmenorrhea is painful menstruation in the absence of pathology.  The various options for dysmenorrhea were discussed to include nonsteroidal antiinflammatory drugs, hormonal suppression, or both.  The patient was informed secondary dysmenorrhea is a result of pelvic pathology and is more common in patients with severe dysmenorrhea at menarche or progressively worsening dysmenorrhea, abnormal uterine bleeding, mid-cycle or  acyclic pain, infertility, family history of endometriosis, dyspareunia, or lack of response to empiric therapy.  Evaluation for secondary causes includes pelvic ultrasonography and possible laparoscopy.  The various treatment options for secondary dysmenorrhea depends upon the etiology as discussed.   - US Non-ob Transvaginal; Future    3. Iron deficiency anemia, unspecified iron deficiency anemia type  Patient to continue her FeSO4 325 mg once daily.  - CBC & Differential    4. Adrenal abnormality  Patient with abnormal DHEA-S.  Patient to return for fasting labs early a.m. as discussed.  - Testosterone, Free, Total  - Cortisol - AM; Future  - DHEA  - DHEA-Sulfate    5. Abnormal glucose  Patient with previous elevated hemoglobin A1c as well as glucose level.  Patient will return for fasting labs as discussed.  - Comprehensive Metabolic Panel  - Insulin, Total; Future  - Hemoglobin A1c    Follow Up/Instructions:  Follow up as noted.  Patient was given instructions and counseling regarding her condition or for health maintenance advice. Please see specific information pulled into the AVS if appropriate.     Note: Speech recognition transcription software may have been used to dictate portions of this document.  An attempt at proofreading has been made though minor errors in transcription may still be present.    This note was electronically signed.  Dede Kinsey M.D.

## 2025-04-15 ENCOUNTER — OFFICE VISIT (OUTPATIENT)
Dept: BEHAVIORAL HEALTH | Facility: CLINIC | Age: 35
End: 2025-04-15
Payer: COMMERCIAL

## 2025-04-15 DIAGNOSIS — F41.1 GAD (GENERALIZED ANXIETY DISORDER): ICD-10-CM

## 2025-04-15 DIAGNOSIS — F34.1 PERSISTENT DEPRESSIVE DISORDER: Primary | ICD-10-CM

## 2025-04-15 NOTE — PROGRESS NOTES
Initial Evaluation      Date Encounter: 04/15/2025   Name: Sophie Dawkins  MRN: [id number]  : 1990    Time In: 15:30  Time Out: 16:00     Referring Provider: Shante Grey APRN    Chief Complaint: (F34.1) Persistent depressive disorder    (F41.1) SILAS (generalized anxiety disorder)     History of Present Illness:  Sophie Dawkins is a 34 y.o. female who is being seen today for initial therapy evaluation.  Patient arrived on time for appointment.  Patient was dressed appropriately.  Patient was cooperative and consented to treatment.  Patient reported she came for behavioral health to work on her own,mental health relationship with her 's ,adjustment to life changes regarding her children as they become more independent, and setting up appropriate boundaries.  Patient denies any suicidal thoughts or plans or intent.  Patient denies any self-harming behavior.  Patient reported self-harming as a teenager.  Patient denies any inpatient psychiatric hospitalizations.  Patient rates her anxiety and depression as a 6 out of 10 but is manageable.  Patient reports she feels like a robot but is able to complete daily living tasks.      Subjective     Assessment Scores:   Last PHQ-9 Score: 9    SILAS-7  Feeling nervous, anxious or on edge: Nearly every day  Not being able to stop or control worrying: More than half the days  Worrying too much about different things: Nearly every day  Trouble Relaxing: Not at all  Being so restless that it is hard to sit still: Not at all  Feeling afraid as if something awful might happen: Not at all  Becoming easily annoyed or irritable: Several days  SILAS 7 Total Score: 9  If you checked any problems, how difficult have these problems made it for you to do your work, take care of things at home, or get along with other people: Somewhat difficult    Patient's Support Network Includes: Patient reports limited support.    Patient Trauma/Abuse History: Patient reports childhood  Patient is called and read physicians complete notation   She states that she will schedule a follow up if swelling does not go down.  Denies any SOB   experiences of emotional and physical abuse.    Work/Educational History: Reports working full-time at home.    Social History:  Current living situation: Patient reports currently living with her  and her 2 children.        Mental/Behavioral Health History:   Previous Suicide Attempts: Patient reports no history of suicide attempts.    Hx of Psychiatric or Detox Hospitalizations:  No      Family Psychiatric History:  Patient reports no alcohol or substance abuse history.  Patient reports undiagnosed mental health from her mom and maternal grandmother.    Legal History:   The patient has no significant history of legal issues.    Substance Use History  Active Use: No    Current Stressors: Life changes, relationship with , relationship with mom,and son's education    Social History:   Social History     Socioeconomic History    Marital status:    Tobacco Use    Smoking status: Never    Smokeless tobacco: Never   Vaping Use    Vaping status: Never Used   Substance and Sexual Activity    Alcohol use: Not Currently     Comment: Rarely, for special occasions.    Drug use: Never    Sexual activity: Yes     Partners: Male     Birth control/protection: Tubal ligation        Past Medical History:   Past Medical History:   Diagnosis Date    Abnormal ECG 2000    Doctor stated small heart murmur, but nothing done.    Abnormal Pap smear of cervix 2006    Pre cancerous cells, used in office freeze treatment.    Anxiety     Chronic ear infection     right ear mostly    Diabetes mellitus 1/21/25    Controlled.    Headache     Herpes 2018    HPV (human papilloma virus) infection 2018    Previous gyno- [facility] before closing did a test and said i was positive. No more info than that.    Hypertension 1/21/25    Dr. De La Torre for a month.    Multiple gestation 2007, 2010    2 kids, natural births, tubes tied.    Persistent depressive disorder 04/01/2025    Postoperative wound infection 1/28/2025    Cat  Scratch Fever - Healed    Rh incompatibility     Took shots with both pregnancies.       Past Surgical History:   Past Surgical History:   Procedure Laterality Date    GYNECOLOGIC CRYOSURGERY      LYMPH NODE BIOPSY      TUBAL ABDOMINAL LIGATION  2010    After second birth of child.    WISDOM TOOTH EXTRACTION         Family History:   Family History   Problem Relation Age of Onset    Diabetes Father         Takes meds for diabetes.    Depression Father     Hyperlipidemia Father     Hypertension Father     Migraines Father     Diabetes Maternal Grandmother         Takes meds for diabetes.    Hyperlipidemia Maternal Grandmother     Vision loss Maternal Grandmother         Glaucoma / Wet Macular Degeneration    Hypertension Maternal Grandmother     Anxiety disorder Mother     Depression Mother     Hyperlipidemia Mother     Thyroid disease Mother         Grave’s Disease    Hypertension Mother     Heart disease Maternal Grandfather          of heart attack with sepsis.    Hyperlipidemia Maternal Grandfather     Hypertension Maternal Grandfather        Medications:     Current Outpatient Medications:     desvenlafaxine (PRISTIQ) 50 MG 24 hr tablet, Take 1 tablet by mouth Daily., Disp: 30 tablet, Rfl: 2    ferrous sulfate 325 (65 FE) MG tablet, Take 1 tablet by mouth Daily With Breakfast., Disp: 90 tablet, Rfl: 0    multivitamin (MULTI VITAMIN PO), Take  by mouth Daily. Stephanie Mariscal's multivitamin + hair growth, Disp: , Rfl:     vitamin D (ERGOCALCIFEROL) 1.25 MG (24809 UT) capsule capsule, Take 1 capsule by mouth 1 (One) Time Per Week., Disp: 12 capsule, Rfl: 0    Allergies:   No Known Allergies     Objective       MENTAL STATUS EXAM   General Appearance:  Cleanly groomed and dressed  Eye Contact:  Good eye contact  Attitude:  Cooperative  Motor Activity:  Normal gait, posture  Muscle Strength:  Normal  Speech:  Normal rate, tone, volume  Language:  Other  Other Comment:  Appropriate  Mood and affect:  Normal,  pleasant  Hopelessness:  Denies  Loneliness: Denies  Thought Process:  Logical and goal-directed  Associations/ Thought Content:  No delusions  Hallucinations:  None  Suicidal Ideations:  Not present  Homicidal Ideation:  Not present  Sensorium:  Alert and clear  Orientation:  Person, place, time and situation  Immediate Recall, Recent, and Remote Memory:  Intact  Attention Span/ Concentration:  Good  Fund of Knowledge:  Appropriate for age and educational level  Intellectual Functioning:  Average range  Insight:  Good  Judgement:  Good  Reliability:  Good  Impulse Control:  Good     SUICIDE RISK ASSESSMENT/CSSRS  1. Does patient have thoughts of suicide? no  2. Does patient have intent for suicide? no  3. Does patient have a current plan for suicide? no  4. History of suicide attempts: no  5. Family history of suicide or attempts: no  6. History of violent behaviors towards others or property: no  7. History of sexual aggression toward others: no  8. Access to firearms or weapons: no    Assessment / Plan      Visit Diagnosis/Orders Placed This Visit:    ICD-10-CM ICD-9-CM   1. Persistent depressive disorder  F34.1 300.4   2. SILAS (generalized anxiety disorder)  F41.1 300.02            Prognosis: Good with ongoing treatment    Safety: Patient denies SI/HI.  Therapist and patient reviewed options for help including 911, 238 the suicide hotline, and going to the neared ER.  Therapist will continue to monitor.   Risk Assessment: Risk of self-harm acutely is low. Risk of self-harm chronically is also low, but could be further elevated in the event of treatment noncompliance and/or AODA.    Treatment Plan/Goals: Continue supportive psychotherapy efforts and medications as indicated. Treatment and medication options discussed during today's visit. Patient acknowledged and verbally consented to continue with current treatment plan and was educated on the importance of compliance with treatment and follow-up appointments.  Patient seems reasonably able to adhere to treatment plan.      Assisted Patient in processing above session content; acknowledged and normalized patient’s thoughts, feelings, and concerns.     Allowed Patient to freely discuss issues  without interruption or judgement with unconditional positive regard, active listening skills, and empathy. Therapist provided a safe, confidential environment to facilitate the development of a positive therapeutic relationship and encouraged open, honest communication. Assisted Patient in identifying risk factors which would indicate the need for higher level of care including thoughts to harm self or others and/or self-harming behavior and encouraged Patient to contact this office, call [phone] or present to the nearest emergency room should any of these events occur. Discussed crisis intervention services and means to access. Patient adamantly and convincingly denies current suicidal or homicidal ideation or perceptual disturbance. Assisted Patient in processing session content; acknowledged and normalized Patient’s thoughts, feelings, and concerns by utilizing a person-centered approach in efforts to build appropriate rapport and a positive therapeutic relationship with open and honest communication.     Follow Up:   Return in about 2 weeks (around 4/29/2025).      LYNN Tierney   [facility]

## 2025-04-20 LAB
ALBUMIN SERPL-MCNC: 4.2 G/DL (ref 3.5–5.2)
ALBUMIN/GLOB SERPL: 1.6 G/DL
ALP SERPL-CCNC: 44 U/L (ref 39–117)
ALT SERPL-CCNC: 15 U/L (ref 1–33)
AST SERPL-CCNC: 15 U/L (ref 1–32)
BASOPHILS # BLD AUTO: 0.07 10*3/MM3 (ref 0–0.2)
BASOPHILS NFR BLD AUTO: 1.7 % (ref 0–1.5)
BILIRUB SERPL-MCNC: 0.4 MG/DL (ref 0–1.2)
BUN SERPL-MCNC: 14 MG/DL (ref 6–20)
BUN/CREAT SERPL: 17.1 (ref 7–25)
CALCIUM SERPL-MCNC: 9.3 MG/DL (ref 8.6–10.5)
CHLORIDE SERPL-SCNC: 105 MMOL/L (ref 98–107)
CO2 SERPL-SCNC: 23 MMOL/L (ref 22–29)
CORTIS AM PEAK SERPL-MCNC: 15.5 UG/DL (ref 6.2–19.4)
CREAT SERPL-MCNC: 0.82 MG/DL (ref 0.57–1)
DHEA SERPL-MCNC: 1176 NG/DL (ref 31–701)
DHEA-S SERPL-MCNC: 488 UG/DL (ref 57.3–279.2)
EGFRCR SERPLBLD CKD-EPI 2021: 95.2 ML/MIN/1.73
EOSINOPHIL # BLD AUTO: 0.07 10*3/MM3 (ref 0–0.4)
EOSINOPHIL NFR BLD AUTO: 1.7 % (ref 0.3–6.2)
ERYTHROCYTE [DISTWIDTH] IN BLOOD BY AUTOMATED COUNT: 19 % (ref 12.3–15.4)
GLOBULIN SER CALC-MCNC: 2.6 GM/DL
GLUCOSE SERPL-MCNC: 97 MG/DL (ref 65–99)
HBA1C MFR BLD: 5.3 % (ref 4.8–5.6)
HCT VFR BLD AUTO: 40.8 % (ref 34–46.6)
HGB BLD-MCNC: 13.6 G/DL (ref 12–15.9)
IMM GRANULOCYTES # BLD AUTO: 0.01 10*3/MM3 (ref 0–0.05)
IMM GRANULOCYTES NFR BLD AUTO: 0.2 % (ref 0–0.5)
INSULIN SERPL-ACNC: 9.5 UIU/ML (ref 2.6–24.9)
LYMPHOCYTES # BLD AUTO: 1.79 10*3/MM3 (ref 0.7–3.1)
LYMPHOCYTES NFR BLD AUTO: 44 % (ref 19.6–45.3)
MCH RBC QN AUTO: 27.5 PG (ref 26.6–33)
MCHC RBC AUTO-ENTMCNC: 33.3 G/DL (ref 31.5–35.7)
MCV RBC AUTO: 82.4 FL (ref 79–97)
MONOCYTES # BLD AUTO: 0.34 10*3/MM3 (ref 0.1–0.9)
MONOCYTES NFR BLD AUTO: 8.4 % (ref 5–12)
NEUTROPHILS # BLD AUTO: 1.79 10*3/MM3 (ref 1.7–7)
NEUTROPHILS NFR BLD AUTO: 44 % (ref 42.7–76)
NRBC BLD AUTO-RTO: 0 /100 WBC (ref 0–0.2)
PLATELET # BLD AUTO: 338 10*3/MM3 (ref 140–450)
POTASSIUM SERPL-SCNC: 4.1 MMOL/L (ref 3.5–5.2)
PROT SERPL-MCNC: 6.8 G/DL (ref 6–8.5)
RBC # BLD AUTO: 4.95 10*6/MM3 (ref 3.77–5.28)
SODIUM SERPL-SCNC: 140 MMOL/L (ref 136–145)
TESTOST FREE SERPL-MCNC: 5.5 PG/ML (ref 0–4.2)
TESTOST SERPL-MCNC: 58 NG/DL (ref 8–60)
WBC # BLD AUTO: 4.07 10*3/MM3 (ref 3.4–10.8)

## 2025-04-22 ENCOUNTER — TELEPHONE (OUTPATIENT)
Dept: OBSTETRICS AND GYNECOLOGY | Facility: CLINIC | Age: 35
End: 2025-04-22

## 2025-04-22 DIAGNOSIS — E27.9 ADRENAL ABNORMALITY: Primary | ICD-10-CM

## 2025-04-22 NOTE — TELEPHONE ENCOUNTER
I spoke with patient and explained the ultrasound results. She wants to go ahead and schedule the surgery. Will you please put in the case request and then Shanti can schedule this. Thanks

## 2025-04-22 NOTE — TELEPHONE ENCOUNTER
----- Message from Dede Kinsey sent at 4/22/2025 12:07 PM EDT -----  Please inform the patient I have reviewed her transvaginal ultrasound.  She has a 3 cm intramural fibroid.  The endometrium is thickened and there is a 1.5 cm focal lesion consistent with probable polyp.  She has a simple 3.4 cm cyst on her left ovary.  The ovarian cyst should resolve.  The fibroid is not near the endometrium.  The thickened endometrium and polyp however is probably the source of her irregular bleeding.  I recommend a D&C with diagnostic hysteroscopy for removal of the polyp.  Thank you.

## 2025-04-23 DIAGNOSIS — R93.5 ABNORMAL ULTRASOUND OF ENDOMETRIUM: Primary | ICD-10-CM

## 2025-04-23 RX ORDER — SODIUM CHLORIDE 0.9 % (FLUSH) 0.9 %
10 SYRINGE (ML) INJECTION AS NEEDED
OUTPATIENT
Start: 2025-04-23

## 2025-04-23 RX ORDER — SODIUM CHLORIDE 0.9 % (FLUSH) 0.9 %
3 SYRINGE (ML) INJECTION EVERY 12 HOURS SCHEDULED
OUTPATIENT
Start: 2025-04-23

## 2025-04-23 RX ORDER — SODIUM CHLORIDE 9 MG/ML
40 INJECTION, SOLUTION INTRAVENOUS AS NEEDED
OUTPATIENT
Start: 2025-04-23

## 2025-05-01 ENCOUNTER — PRE-ADMISSION TESTING (OUTPATIENT)
Dept: PREADMISSION TESTING | Facility: HOSPITAL | Age: 35
End: 2025-05-01
Payer: COMMERCIAL

## 2025-05-01 VITALS — BODY MASS INDEX: 30.75 KG/M2 | HEIGHT: 65 IN | WEIGHT: 184.6 LBS

## 2025-05-01 DIAGNOSIS — R93.5 ABNORMAL ULTRASOUND OF ENDOMETRIUM: ICD-10-CM

## 2025-05-01 LAB
BILIRUB UR QL STRIP: NEGATIVE
CLARITY UR: CLEAR
COLOR UR: YELLOW
GLUCOSE UR STRIP-MCNC: NEGATIVE MG/DL
HGB UR QL STRIP.AUTO: NEGATIVE
KETONES UR QL STRIP: NEGATIVE
LEUKOCYTE ESTERASE UR QL STRIP.AUTO: NEGATIVE
NITRITE UR QL STRIP: NEGATIVE
PH UR STRIP.AUTO: 7 [PH] (ref 5–8)
PROT UR QL STRIP: NEGATIVE
SP GR UR STRIP: 1.02 (ref 1–1.03)
UROBILINOGEN UR QL STRIP: NORMAL

## 2025-05-01 PROCEDURE — 81003 URINALYSIS AUTO W/O SCOPE: CPT

## 2025-05-01 PROCEDURE — 85025 COMPLETE CBC W/AUTO DIFF WBC: CPT | Performed by: OBSTETRICS & GYNECOLOGY

## 2025-05-01 NOTE — DISCHARGE INSTRUCTIONS
Pre-Admission testing appointment completed today for patient's upcoming procedure here at [facility].    PAT PASS reviewed with patient and they verbalize understanding of the following:     Do not eat or drink anything after midnight the night before procedure unless otherwise instructed by physician/surgeon's office, this includes no gum, candy, mints, tobacco products or e-cigarettes.  Do not shave the area to be operated on at least 48 hours prior to procedure.  Do not wear makeup, lotion, hair products, or nail polish.  Do not wear any jewelry and remove all piercings.  Do not wear any adhesive if you wear dentures.  Do not wear contacts; bring in glasses if needed.  Only take medications on the morning of procedure as instructed by PAT nurse per anesthesia guidelines or as instructed by physician's office.  If you are on any blood thinners reach out to the physician/surgeon's office for instructions on when/if they will need to be stopped prior to procedure.  Bring in picture ID and insurance card, advanced directive copies if applicable, CPAP/BIPAP/Inhalers if indicated morning of procedure, leave any other valuables at home.  Ensure you have arranged for someone to drive you home the day of your procedure and someone to care for you at home afterwards. It is recommended that you do not drive, drink alcohol, or make any major legal decisions for at least 24 hours after your procedure is complete.  Chlorhexadine sponge along with instruction/information sheet given to patient. Readybath wipes given in lieu of CHG if patient has CHG allergy. Instructed patient to date, time, and initial the verification sheet once skin prep has been completed, and to return [facility] the day of the procedure.  ERAS instructions given to patient unless otherwise instructed per surgeon's orders.     Anesthesia FAQ tip sheet given to patient.  Instructions and information given to patient about parking, hospital  entrance, and registration location.

## 2025-05-06 ENCOUNTER — OFFICE VISIT (OUTPATIENT)
Dept: BEHAVIORAL HEALTH | Facility: CLINIC | Age: 35
End: 2025-05-06
Payer: COMMERCIAL

## 2025-05-06 DIAGNOSIS — F34.1 PERSISTENT DEPRESSIVE DISORDER: ICD-10-CM

## 2025-05-06 DIAGNOSIS — F41.1 GAD (GENERALIZED ANXIETY DISORDER): Primary | ICD-10-CM

## 2025-05-06 NOTE — PROGRESS NOTES
Follow Up Note       Date Encounter: 2025   Name: Sophie Dawkins  MRN: [id number]  : 1990    Time In: 13:45  Time Out: 14:23     Referring Provider: Shante Grey APRN    Chief Complaint: (F41.1) SILAS (generalized anxiety disorder)    (F34.1) Persistent depressive disorder     History of Present Illness:   Sophie Dawkins is a 34 y.o. female who is being seen today for follow up for individual Psychotherapy session. Patient arrived on time for appointment. Patient is dressed appropriately. Patient is cooperative and consented to treatment. Therapist and patient completed check in. Patient reported that she and her mother are going camping for Mother's Day weekend.  Patient reported that she feels her medications is working well. Patient rated anxiety a 6 out of 10 with 10 being the worst and depression a 4 out of 10 with 10 being the worst. Patient reports she is feeling more confident and has been working on setting boundaries. Therapist and patient reviewed goals. Therapist provided psycho education on healthy boundaries and assertive communication. Therapist and patient reviewed grounding techniques. Patient denies having any suicidal thoughts, plans or intent. Patient denied having any self injurious behaviors.       Subjective     Assessment Scores:   Last PHQ-9 Score: 11    SILAS-7  Feeling nervous, anxious or on edge: Several days  Not being able to stop or control worrying: Several days  Worrying too much about different things: Several days  Trouble Relaxing: Not at all  Being so restless that it is hard to sit still: Not at all  Feeling afraid as if something awful might happen: Not at all  Becoming easily annoyed or irritable: Several days  SILAS 7 Total Score: 4  If you checked any problems, how difficult have these problems made it for you to do your work, take care of things at home, or get along with other people: Somewhat difficult      Medications:     Current Outpatient Medications:      desvenlafaxine (PRISTIQ) 50 MG 24 hr tablet, Take 1 tablet by mouth Daily., Disp: 30 tablet, Rfl: 2    ferrous sulfate 325 (65 FE) MG tablet, Take 1 tablet by mouth Daily With Breakfast., Disp: 90 tablet, Rfl: 0    multivitamin (MULTI VITAMIN PO), Take  by mouth Daily. Stephanie Rivera multivitamin + hair growth, Disp: , Rfl:     vitamin D (ERGOCALCIFEROL) 1.25 MG (98518 UT) capsule capsule, Take 1 capsule by mouth 1 (One) Time Per Week., Disp: 12 capsule, Rfl: 0    Allergies:   No Known Allergies     Objective       Mental Status Exam:   MENTAL STATUS EXAM   General Appearance:  Cleanly groomed and dressed  Eye Contact:  Good eye contact  Attitude:  Cooperative  Motor Activity:  Normal gait, posture  Muscle Strength:  Normal  Speech:  Normal rate, tone, volume  Mood and affect:  Normal, pleasant  Thought Process:  Logical  Associations/ Thought Content:  No delusions  Hallucinations:  None  Suicidal Ideations:  Not present  Homicidal Ideation:  Not present  Sensorium:  Alert and clear  Orientation:  Place, time, situation and person  Immediate Recall, Recent, and Remote Memory:  Intact  Attention Span/ Concentration:  Good  Fund of Knowledge:  Appropriate for age and educational level  Intellectual Functioning:  Average range  Insight:  Good  Judgement:  Good  Reliability:  Good  Impulse Control:  Good       Assessment / Plan      Visit Diagnosis/Orders Placed This Visit:    ICD-10-CM ICD-9-CM   1. SILAS (generalized anxiety disorder)  F41.1 300.02   2. Persistent depressive disorder  F34.1 300.4        PLAN:     Prognosis: Good with ongoing treatment    Safety: Patient denies SI/HI.  Therapist and patient reviewed options for help including 911 988 the suicide hotline, and going to the neared ER.  Therapist will continue to monitor.   Risk Assessment: Risk of self-harm acutely is low. Risk of self-harm chronically is also low, but could be further elevated in the event of treatment noncompliance and/or  AODA.    Treatment Plan/Goals: Continue supportive psychotherapy efforts and medications as indicated. Treatment and medication options discussed during today's visit. Patient acknowledged and verbally consented to continue with current treatment plan and was educated on the importance of compliance with treatment and follow-up appointments. Patient seems reasonably able to adhere to treatment plan.      Assisted Patient in processing above session content; acknowledged and normalized patient’s thoughts, feelings, and concerns.      Allowed Patient to freely discuss issues  without interruption or judgement with unconditional positive regard, active listening skills, and empathy. Therapist provided a safe, confidential environment to facilitate the development of a positive therapeutic relationship and encouraged open, honest communication. Assisted Patient in identifying risk factors which would indicate the need for higher level of care including thoughts to harm self or others and/or self-harming behavior and encouraged Patient to contact this office, call [phone], or present to the nearest emergency room should any of these events occur. Discussed crisis intervention services and means to access. Patient adamantly and convincingly denies current suicidal or homicidal ideation or perceptual disturbance. Assisted Patient in processing session content; acknowledged and normalized Patient’s thoughts, feelings, and concerns by utilizing a person-centered approach in efforts to build appropriate rapport and a positive therapeutic relationship with open and honest communication.     Follow Up:   Return in about 2 weeks (around 5/20/2025).       LYNN Tierney   [facility]

## 2025-05-13 ENCOUNTER — OFFICE VISIT (OUTPATIENT)
Dept: BEHAVIORAL HEALTH | Facility: CLINIC | Age: 35
End: 2025-05-13
Payer: COMMERCIAL

## 2025-05-13 VITALS — HEIGHT: 65 IN | BODY MASS INDEX: 30.79 KG/M2 | WEIGHT: 184.8 LBS

## 2025-05-13 DIAGNOSIS — F34.1 PERSISTENT DEPRESSIVE DISORDER: ICD-10-CM

## 2025-05-13 DIAGNOSIS — F41.1 GAD (GENERALIZED ANXIETY DISORDER): ICD-10-CM

## 2025-05-13 RX ORDER — DESVENLAFAXINE 50 MG/1
50 TABLET, FILM COATED, EXTENDED RELEASE ORAL DAILY
Qty: 90 TABLET | Refills: 1 | Status: SHIPPED | OUTPATIENT
Start: 2025-05-13

## 2025-05-13 NOTE — PROGRESS NOTES
"           Follow Up Office Visit      Date: 2025   Patient Name: Sophie Dawkins  : 1990   MRN: [id number]     Patient or patient representative verbalized consent for the use of Ambient Listening during the visit with  LUCY Rowe for chart documentation. 2025  11:52 EDT    Chief Complaint:  Sophie Dawkins is a 34 y.o. female who is here today for follow up with medication management for depression and anxiety.    History of Present Illness  She reports an improvement in her condition, attributing it to the efficacy of Pristiq. She has been on this medication for 6 weeks. She notes a significant reduction in the time it takes for her to fall asleep, from approximately 1 to 1.5 hours to between 15 to 30 minutes. Additionally, she perceives a stabilization in her mood and a decrease in anxiety levels. She recalls missing a single dose during a camping trip, which resulted in a slight dip in her mood the following day. She takes the medication at night and believes it has enhanced her sleep quality. She reports no adverse effects from the medication. She is currently engaged in therapy with [facility].    Interim History: The patient describes feeling \"better\" overall. She notes that her mood has \"evened out\" and she feels \"more content about things.\" She also reports a decrease in anxiety about everyday activities. There have been no significant changes in behavior or symptoms since the last visit, aside from the noted improvement.    Social History:  - Engaged in therapy with R[facility]  - Recently went on a camping trip    Pertinent Negatives: No adverse effects from Pristiq reported. No significant anxiety or mood disturbances noted aside from the single missed dose incident.    Subjective     Review of Systems:   Review of Systems   Neurological:  Negative for dizziness and confusion.   Psychiatric/Behavioral: Negative.         Screening Scores:   PHQ-9: 7 (last visit, 13)  SILAS-7: 3 (last " "visit, 16)    Medications:     Current Outpatient Medications:     desvenlafaxine (PRISTIQ) 50 MG 24 hr tablet, Take 1 tablet by mouth Daily., Disp: 90 tablet, Rfl: 1    ferrous sulfate 325 (65 FE) MG tablet, Take 1 tablet by mouth Daily With Breakfast., Disp: 90 tablet, Rfl: 0    multivitamin (MULTI VITAMIN PO), Take  by mouth Daily. Stephanie Mariscal's multivitamin + hair growth, Disp: , Rfl:     vitamin D (ERGOCALCIFEROL) 1.25 MG (69531 UT) capsule capsule, Take 1 capsule by mouth 1 (One) Time Per Week., Disp: 12 capsule, Rfl: 0    Allergies:   No Known Allergies    Results Reviewed: n/a     The following portion of the patient's history were reviewed and updated appropriately: allergies, current and past medications, family history, medical history and social history.    Objective     Vital Signs:   Vitals:    05/13/25 1144   Weight: 83.8 kg (184 lb 12.8 oz)   Height: 165.1 cm (65\")     Body mass index is 30.75 kg/m².     Mental Status Exam:   MENTAL STATUS EXAM   General Appearance:  Cleanly groomed and dressed  Eye Contact:  Good eye contact  Attitude:  Cooperative  Motor Activity:  Normal gait, posture  Muscle Strength:  Normal  Speech:  Normal rate, tone, volume  Language:  Spontaneous  Mood and affect:  Normal, pleasant  Hopelessness:  Denies  Loneliness: Denies  Thought Process:  Logical  Associations/ Thought Content:  No delusions  Hallucinations:  None  Suicidal Ideations:  Not present  Homicidal Ideation:  Not present  Sensorium:  Alert and clear  Orientation:  Person, place, time and situation  Immediate Recall, Recent, and Remote Memory:  Intact  Attention Span/ Concentration:  Easily distracted  Fund of Knowledge:  Appropriate for age and educational level  Intellectual Functioning:  Average range  Insight:  Good  Judgement:  Good  Reliability:  Good  Impulse Control:  Good        SUICIDE RISK ASSESSMENT/CSSRS:  1. Does patient have thoughts of suicide? no  2. Does patient have intent for suicide? no  3. " Does patient have a current plan for suicide? no  4. History of suicide attempts: no  5. Family history of suicide or attempts: no  6. History of violent behaviors towards others or property or thoughts of committing suicide: no  7. History of sexual aggression toward others: no  8. Access to firearms or weapons: no    Labs Reviewed: n/a  UDS Reviewed: n/a  Chart since last visit reviewed: yes    Assessment / Plan    Quality Measures:  Tobacco cessation: Patient denies tobacco use. No tobacco cessation education necessary.     Depression (PHQ >9): Patient screened negative this visit with a PHQ score < 9. Will continue to monitor screening scores and provide supportive care.     Medication Considerations:  Benzo: n/a  Stimulants: n/a   SABRA reviewed and appropriate.     Risk Assessment: Risk of self-harm acutely is low. Risk of self-harm chronically is also low, but could be further elevated in the event of treatment noncompliance and/or AODA.    Impression/Formulation:  Patient appeared alert and oriented.  Patient is voluntarily seeking psychiatric care at [facility].  Patient is receptive to assistance with maintaining a stable lifestyle.  Conditions being treated include     ICD-10-CM ICD-9-CM   1. Persistent depressive disorder  F34.1 300.4   2. SILAS (generalized anxiety disorder)  F41.1 300.02   .     Visit Diagnosis/Orders Placed This Visit:  Diagnoses and all orders for this visit:    1. Persistent depressive disorder  -     desvenlafaxine (PRISTIQ) 50 MG 24 hr tablet; Take 1 tablet by mouth Daily.  Dispense: 90 tablet; Refill: 1    2. SILAS (generalized anxiety disorder)  -     desvenlafaxine (PRISTIQ) 50 MG 24 hr tablet; Take 1 tablet by mouth Daily.  Dispense: 90 tablet; Refill: 1         Assessment & Plan  Content of Therapy:  During the session, the patient discussed her experiences with Pristiq, noting improvements in sleep, mood stabilization, and reduced anxiety. She shared that  she can fall asleep more easily and feels more content with everyday activities. The patient also mentioned missing a dose during a camping trip and experiencing a slight mood dip, which was addressed. The discussion included potential side effects of Pristiq, interactions with NSAIDs, and the sedative effects of allergy medications. The importance of adjusting the timing of medication intake based on its effects was emphasized.    Clinical Impression:  The patient appears to be responding well to Pristiq, with notable improvements in sleep quality, mood stabilization, and reduced anxiety. She reports feeling more content and less anxious about daily activities. There have been no significant negative side effects reported, and she is tolerating the medication well. The slight mood dip after missing a dose was likely due to the temporary interruption in medication consistency. Overall, her mental health is improving, and she is experiencing positive changes in her symptoms.    Therapeutic Intervention:  - Cognitive-behavioral strategies were employed to reframe thoughts and explore feelings related to anxiety and mood stabilization.  - Psychoeducation was provided regarding the effects and potential side effects of Pristiq, including interactions with NSAIDs and allergy medications.  - Mindfulness exercises were suggested to help manage anxiety and improve sleep quality.    Plan:  - Continue Pristiq 50 mg nightly.   - Monitor for any side effects and report any such changes.  - Adjust the timing of Pristiq intake based on its effects on sleep.  - Avoid consistent use of NSAIDs to prevent potential interactions and increased risk of bleeding.  - Consider alternative allergy medications if sedative effects are problematic.  - Prescription for a 90-day supply of Pristiq with one refill will be sent to the pharmacy.    Follow-up:  - Schedule a follow-up appointment in 3 months to assess medication effectiveness and any  need for adjustments.  - If no changes are needed, subsequent follow-up will be scheduled for 6 months.    Notes & Risk Factors:  - No current risk factors for harm to self or others reported.  - Protective factors include engagement in therapy and positive response to medication.    Any medications prescribed have been sent electronically to [facility] in [place].       Patient will continue supportive psychotherapy efforts and medications as indicated.  Discussed medication options and treatment plan of prescribed medication(s) as well as the risks, benefits, and potential side effects. Patient will contact this office, call 911 or present to the nearest emergency room should suicidal or homicidal ideations occur. Clinic will obtain release of information for current treatment team for continuity of care as needed. Patient ackowledged and verbally consented to continue with current treatment plan and was educated on the importance of compliance with treatment and follow-up appointments.           LUCY Love  [facility]    This is electronically signed by LUCY Love  05/13/2025 09:24 EDT

## 2025-05-15 ENCOUNTER — ANESTHESIA EVENT (OUTPATIENT)
Dept: PERIOP | Facility: HOSPITAL | Age: 35
End: 2025-05-15
Payer: COMMERCIAL

## 2025-05-16 ENCOUNTER — ANESTHESIA (OUTPATIENT)
Dept: PERIOP | Facility: HOSPITAL | Age: 35
End: 2025-05-16
Payer: COMMERCIAL

## 2025-05-16 ENCOUNTER — HOSPITAL ENCOUNTER (OUTPATIENT)
Facility: HOSPITAL | Age: 35
Setting detail: HOSPITAL OUTPATIENT SURGERY
Discharge: HOME | End: 2025-05-16
Attending: OBSTETRICS & GYNECOLOGY | Admitting: OBSTETRICS & GYNECOLOGY
Payer: COMMERCIAL

## 2025-05-16 VITALS
SYSTOLIC BLOOD PRESSURE: 121 MMHG | DIASTOLIC BLOOD PRESSURE: 92 MMHG | HEART RATE: 60 BPM | OXYGEN SATURATION: 100 % | TEMPERATURE: 97.6 F | RESPIRATION RATE: 16 BRPM

## 2025-05-16 DIAGNOSIS — R93.5 ABNORMAL ULTRASOUND OF ENDOMETRIUM: ICD-10-CM

## 2025-05-16 LAB
B-HCG UR QL: NEGATIVE
EXPIRATION DATE: NORMAL
GLUCOSE BLDC GLUCOMTR-MCNC: 109 MG/DL (ref 70–130)
INTERNAL NEGATIVE CONTROL: NORMAL
INTERNAL POSITIVE CONTROL: NORMAL
Lab: NORMAL

## 2025-05-16 PROCEDURE — 25010000002 ONDANSETRON PER 1 MG: Performed by: NURSE ANESTHETIST, CERTIFIED REGISTERED

## 2025-05-16 PROCEDURE — 25810000003 LACTATED RINGERS PER 1000 ML: Performed by: OBSTETRICS & GYNECOLOGY

## 2025-05-16 PROCEDURE — 25010000002 PROPOFOL 10 MG/ML EMULSION: Performed by: NURSE ANESTHETIST, CERTIFIED REGISTERED

## 2025-05-16 PROCEDURE — 25010000002 KETOROLAC TROMETHAMINE PER 15 MG: Performed by: NURSE ANESTHETIST, CERTIFIED REGISTERED

## 2025-05-16 PROCEDURE — 25010000002 MIDAZOLAM PER 1MG: Performed by: NURSE ANESTHETIST, CERTIFIED REGISTERED

## 2025-05-16 PROCEDURE — 25010000002 LIDOCAINE (CARDIAC): Performed by: NURSE ANESTHETIST, CERTIFIED REGISTERED

## 2025-05-16 PROCEDURE — 82948 REAGENT STRIP/BLOOD GLUCOSE: CPT

## 2025-05-16 PROCEDURE — 25010000002 DEXAMETHASONE PER 1 MG: Performed by: NURSE ANESTHETIST, CERTIFIED REGISTERED

## 2025-05-16 PROCEDURE — 81025 URINE PREGNANCY TEST: CPT | Performed by: OBSTETRICS & GYNECOLOGY

## 2025-05-16 PROCEDURE — 25010000002 LIDOCAINE 1 % SOLUTION: Performed by: OBSTETRICS & GYNECOLOGY

## 2025-05-16 PROCEDURE — 25010000002 FENTANYL CITRATE (PF) 50 MCG/ML SOLUTION: Performed by: NURSE ANESTHETIST, CERTIFIED REGISTERED

## 2025-05-16 RX ORDER — SODIUM CHLORIDE 0.9 % (FLUSH) 0.9 %
10 SYRINGE (ML) INJECTION AS NEEDED
Status: DISCONTINUED | OUTPATIENT
Start: 2025-05-16 | End: 2025-05-16 | Stop reason: HOSPADM

## 2025-05-16 RX ORDER — ONDANSETRON 2 MG/ML
4 INJECTION INTRAMUSCULAR; INTRAVENOUS ONCE AS NEEDED
Status: DISCONTINUED | OUTPATIENT
Start: 2025-05-16 | End: 2025-05-16 | Stop reason: HOSPADM

## 2025-05-16 RX ORDER — SODIUM CHLORIDE, SODIUM LACTATE, POTASSIUM CHLORIDE, CALCIUM CHLORIDE 600; 310; 30; 20 MG/100ML; MG/100ML; MG/100ML; MG/100ML
50 INJECTION, SOLUTION INTRAVENOUS CONTINUOUS
Status: DISCONTINUED | OUTPATIENT
Start: 2025-05-16 | End: 2025-05-16 | Stop reason: HOSPADM

## 2025-05-16 RX ORDER — LIDOCAINE HYDROCHLORIDE 10 MG/ML
INJECTION, SOLUTION INFILTRATION; PERINEURAL AS NEEDED
Status: DISCONTINUED | OUTPATIENT
Start: 2025-05-16 | End: 2025-05-16 | Stop reason: HOSPADM

## 2025-05-16 RX ORDER — IPRATROPIUM BROMIDE AND ALBUTEROL SULFATE 2.5; .5 MG/3ML; MG/3ML
3 SOLUTION RESPIRATORY (INHALATION) ONCE AS NEEDED
Status: DISCONTINUED | OUTPATIENT
Start: 2025-05-16 | End: 2025-05-16 | Stop reason: HOSPADM

## 2025-05-16 RX ORDER — ONDANSETRON 8 MG/1
8 TABLET, FILM COATED ORAL EVERY 8 HOURS PRN
Qty: 15 TABLET | Refills: 0 | Status: SHIPPED | OUTPATIENT
Start: 2025-05-16

## 2025-05-16 RX ORDER — HYDROCODONE BITARTRATE AND ACETAMINOPHEN 5; 325 MG/1; MG/1
1 TABLET ORAL ONCE AS NEEDED
Refills: 0 | Status: DISCONTINUED | OUTPATIENT
Start: 2025-05-16 | End: 2025-05-16 | Stop reason: HOSPADM

## 2025-05-16 RX ORDER — MIDAZOLAM HYDROCHLORIDE 2 MG/2ML
INJECTION, SOLUTION INTRAMUSCULAR; INTRAVENOUS AS NEEDED
Status: DISCONTINUED | OUTPATIENT
Start: 2025-05-16 | End: 2025-05-16 | Stop reason: SURG

## 2025-05-16 RX ORDER — MAGNESIUM HYDROXIDE 1200 MG/15ML
LIQUID ORAL AS NEEDED
Status: DISCONTINUED | OUTPATIENT
Start: 2025-05-16 | End: 2025-05-16 | Stop reason: HOSPADM

## 2025-05-16 RX ORDER — SODIUM CHLORIDE 0.9 % (FLUSH) 0.9 %
3 SYRINGE (ML) INJECTION EVERY 12 HOURS SCHEDULED
Status: DISCONTINUED | OUTPATIENT
Start: 2025-05-16 | End: 2025-05-16 | Stop reason: HOSPADM

## 2025-05-16 RX ORDER — ONDANSETRON 2 MG/ML
INJECTION INTRAMUSCULAR; INTRAVENOUS AS NEEDED
Status: DISCONTINUED | OUTPATIENT
Start: 2025-05-16 | End: 2025-05-16 | Stop reason: SURG

## 2025-05-16 RX ORDER — DEXAMETHASONE SODIUM PHOSPHATE 4 MG/ML
INJECTION, SOLUTION INTRA-ARTICULAR; INTRALESIONAL; INTRAMUSCULAR; INTRAVENOUS; SOFT TISSUE AS NEEDED
Status: DISCONTINUED | OUTPATIENT
Start: 2025-05-16 | End: 2025-05-16 | Stop reason: SURG

## 2025-05-16 RX ORDER — ONDANSETRON 4 MG/1
4 TABLET, ORALLY DISINTEGRATING ORAL ONCE AS NEEDED
Status: DISCONTINUED | OUTPATIENT
Start: 2025-05-16 | End: 2025-05-16 | Stop reason: HOSPADM

## 2025-05-16 RX ORDER — IBUPROFEN 800 MG/1
800 TABLET, FILM COATED ORAL EVERY 8 HOURS PRN
Qty: 30 TABLET | Refills: 0 | Status: SHIPPED | OUTPATIENT
Start: 2025-05-16

## 2025-05-16 RX ORDER — FENTANYL CITRATE 0.05 MG/ML
INJECTION, SOLUTION INTRAMUSCULAR; INTRAVENOUS AS NEEDED
Status: DISCONTINUED | OUTPATIENT
Start: 2025-05-16 | End: 2025-05-16 | Stop reason: SURG

## 2025-05-16 RX ORDER — PROMETHAZINE HYDROCHLORIDE 12.5 MG/1
12.5 TABLET ORAL ONCE AS NEEDED
Status: DISCONTINUED | OUTPATIENT
Start: 2025-05-16 | End: 2025-05-16 | Stop reason: HOSPADM

## 2025-05-16 RX ORDER — SODIUM CHLORIDE 9 MG/ML
40 INJECTION, SOLUTION INTRAVENOUS AS NEEDED
Status: DISCONTINUED | OUTPATIENT
Start: 2025-05-16 | End: 2025-05-16 | Stop reason: HOSPADM

## 2025-05-16 RX ORDER — PROPOFOL 10 MG/ML
VIAL (ML) INTRAVENOUS AS NEEDED
Status: DISCONTINUED | OUTPATIENT
Start: 2025-05-16 | End: 2025-05-16 | Stop reason: SURG

## 2025-05-16 RX ORDER — HYDROCODONE BITARTRATE AND ACETAMINOPHEN 5; 325 MG/1; MG/1
1 TABLET ORAL EVERY 6 HOURS PRN
Qty: 12 TABLET | Refills: 0 | Status: SHIPPED | OUTPATIENT
Start: 2025-05-16

## 2025-05-16 RX ORDER — KETOROLAC TROMETHAMINE 30 MG/ML
INJECTION, SOLUTION INTRAMUSCULAR; INTRAVENOUS AS NEEDED
Status: DISCONTINUED | OUTPATIENT
Start: 2025-05-16 | End: 2025-05-16 | Stop reason: SURG

## 2025-05-16 RX ADMIN — KETOROLAC TROMETHAMINE 15 MG: 30 INJECTION, SOLUTION INTRAMUSCULAR at 09:20

## 2025-05-16 RX ADMIN — LIDOCAINE HYDROCHLORIDE 60 MG: 20 INJECTION, SOLUTION INTRAVENOUS at 08:51

## 2025-05-16 RX ADMIN — MIDAZOLAM HYDROCHLORIDE 1 MG: 1 INJECTION, SOLUTION INTRAMUSCULAR; INTRAVENOUS at 08:46

## 2025-05-16 RX ADMIN — ONDANSETRON 4 MG: 2 INJECTION INTRAMUSCULAR; INTRAVENOUS at 08:51

## 2025-05-16 RX ADMIN — PROPOFOL 100 MG: 10 INJECTION, EMULSION INTRAVENOUS at 08:51

## 2025-05-16 RX ADMIN — FENTANYL CITRATE 25 MCG: 0.05 INJECTION, SOLUTION INTRAMUSCULAR; INTRAVENOUS at 08:51

## 2025-05-16 RX ADMIN — SODIUM CHLORIDE, POTASSIUM CHLORIDE, SODIUM LACTATE AND CALCIUM CHLORIDE: 600; 310; 30; 20 INJECTION, SOLUTION INTRAVENOUS at 08:46

## 2025-05-16 RX ADMIN — PROPOFOL 120 MCG/KG/MIN: 10 INJECTION, EMULSION INTRAVENOUS at 08:52

## 2025-05-16 RX ADMIN — FENTANYL CITRATE 25 MCG: 0.05 INJECTION, SOLUTION INTRAMUSCULAR; INTRAVENOUS at 09:04

## 2025-05-16 RX ADMIN — DEXAMETHASONE SODIUM PHOSPHATE 4 MG: 4 INJECTION, SOLUTION INTRA-ARTICULAR; INTRALESIONAL; INTRAMUSCULAR; INTRAVENOUS; SOFT TISSUE at 08:51

## 2025-05-16 NOTE — ANESTHESIA POSTPROCEDURE EVALUATION
Patient: Sophie Dawkins    Procedure Summary       Date: 05/16/25 Room / Location: [facility] OR  / [facility] OR    Anesthesia Start: 0846 Anesthesia Stop: 0928    Procedure: DILATATION AND CURETTAGE HYSTEROSCOPY (Vagina) Diagnosis:       Abnormal ultrasound of endometrium      (Abnormal ultrasound of endometrium [R93.5])    Surgeons: Dede Kinsey MD Provider: Mindy Diallo CRNA    Anesthesia Type: MAC ASA Status: 3            Anesthesia Type: MAC    Vitals  Vitals Value Taken Time   /94 05/16/25 09:28   Temp 97.6 °F (36.4 °C) 05/16/25 09:28   Pulse 67 05/16/25 09:28   Resp 16 05/16/25 09:28   SpO2 99 % 05/16/25 09:28           Post Anesthesia Care and Evaluation    Patient location during evaluation: PHASE II  Patient participation: complete - patient participated  Level of consciousness: awake and alert  Pain score: 0  Pain management: satisfactory to patient    Airway patency: patent  Anesthetic complications: No anesthetic complications  PONV Status: none  Cardiovascular status: acceptable and stable  Respiratory status: acceptable  Hydration status: acceptable    Comments: Vitals signs as noted in nursing documentation as per protocol.

## 2025-05-16 NOTE — OP NOTE
SHASTA Dawkins  : 1990  MRN: [id number]  CSN: [id number]  Date: 2025    Operative Report    Pre-op Diagnosis:  Abnormal ultrasound of endometrium [R93.5]  Menorrhagia with irregular cycles  Dysmenorrhea     Post-op Diagnosis:  Post-Op Diagnosis Codes:     * Abnormal ultrasound of endometrium [R93.5]        Same     Procedure: Procedure(s):  DILATATION AND CURETTAGE HYSTEROSCOPY     Surgeon: Dede Kinsey M.D.     Assist: Staff was responsible for performing the following activities: Retraction and Suction and their skilled assistance was necessary for the success of this case.     OR Staff: Circulator: Cammy Reid RN; Nikki Grey RN  Scrub Person: Lilia Alcazar     Anesthesia: IV sedation with 1% lidocaine paracervical block     Estimated Blood Loss: 5 mls     Specimens:  Endometrial     Findings: Grossly normal-appearing endometrium with marked amount of tissue seen and retrieved.  Bilateral ostia identified.     Complications: none       Description of Procedure:  After the appropriate time out and adequate dosing of her anesthesia, the patient had been prepped and draped in the usual sterile fashion.  She was placed in the dorsal lithotomy position using John stirrups.  The bladder had been drained with a red rubber catheter per nursing.  A weighted speculum was placed in the vagina.  The anterior lip of the cervix was grasped with a single-tooth tenaculum.  The cervix was injected at the 3 o'clock and 9 o'clock position with 1% lidocaine plain without any complications.  The cervix was then progressively dilated using Verduzco dilators.  Rigid hysteroscopy was then performed with the above findings noted.  Sharp curettings were then obtained with a good cry throughout with tissue retrieved and sent for pathologic specimen.  Repeat hysteroscopy revealed grossly normal endometrium with tissue removed and no abnormalities noted.  the cervical tenaculum was removed and the cervix  was noted to be hemostatic after the application of 2-0 chromic suture in a figure-of-eight fashion.  All instrument and sponge counts were correct at the end of the procedure.  The patient tolerated the procedure well.  There were no complications.  She was taken to the postoperative recovery room in stable condition.    Dede Kinsey M.D.  5/16/2025  09:30 EDT

## 2025-05-16 NOTE — H&P
Gurdeep Dawkins  : 1990  MRN: [id number]  CSN: [id number]    History and Physical  Subjective   Sophie Dawkins is a 34 y.o. year old  who presents for surgery due to menorrhagia with irregular cycles.  Patient had recently been seen in the office with complaints of heavy and irregular vaginal bleeding.  She had a transvaginal ultrasound showing a thickened endometrium with a 1.5 cm hyperechoic focal lesion consistent with probable polyp.  She is here for D&C with diagnostic hysteroscopy and MyoSure.    History  Past Medical History:   Diagnosis Date    Abnormal ECG     Doctor stated small heart murmur, but nothing done.    Abnormal Pap smear of cervix 2006    Pre cancerous cells, used in office freeze treatment.    Anemia     Anxiety     Chronic ear infection     right ear mostly    Diabetes mellitus 23    Controlled.    Headache     Heavy menses     Herpes 2018    HPV (human papilloma virus) infection 2018    Previous g[facility]o- [facility] before closing did a test and said i was positive. No more info than that.    Hypertension 23    Dr. De La Torre for a month.    Irregular menses     Multiple gestation ,     2 kids, natural births, tubes tied.    Persistent depressive disorder 2025    Postoperative wound infection 2025    Cat Scratch Fever - Healed    Rh incompatibility     Took shots with both pregnancies.    Vitamin D deficiency      No current facility-administered medications on file prior to encounter.     Current Outpatient Medications on File Prior to Encounter   Medication Sig Dispense Refill    ferrous sulfate 325 (65 FE) MG tablet Take 1 tablet by mouth Daily With Breakfast. 90 tablet 0    multivitamin (MULTI VITAMIN PO) Take  by mouth Daily. Stephanie Mariscal's multivitamin + hair growth      vitamin D (ERGOCALCIFEROL) 1.25 MG (07196 UT) capsule capsule Take 1 capsule by mouth 1 (One) Time Per Week. 12 capsule 0     No Known Allergies  Past  Surgical History:   Procedure Laterality Date    GYNECOLOGIC CRYOSURGERY      LYMPH NODE BIOPSY      right groin    TUBAL ABDOMINAL LIGATION  2010    After second birth of child.    WISDOM TOOTH EXTRACTION       Family History   Problem Relation Age of Onset    Diabetes Father         Takes meds for diabetes.    Depression Father     Hyperlipidemia Father     Hypertension Father     Migraines Father     Obesity Father     Diabetes Maternal Grandmother         Takes meds for diabetes.    Hyperlipidemia Maternal Grandmother     Vision loss Maternal Grandmother         Glaucoma / Wet Macular Degeneration    Hypertension Maternal Grandmother     Anxiety disorder Mother     Depression Mother     Hyperlipidemia Mother     Thyroid disease Mother         Grave’s Disease    Hypertension Mother     Obesity Mother     Heart disease Maternal Grandfather          of heart attack with sepsis.    Hyperlipidemia Maternal Grandfather     Hypertension Maternal Grandfather      Social History     Socioeconomic History    Marital status:    Tobacco Use    Smoking status: Never    Smokeless tobacco: Never   Vaping Use    Vaping status: Never Used   Substance and Sexual Activity    Alcohol use: Not Currently     Comment: Rarely, for special occasions.    Drug use: Never    Sexual activity: Yes     Partners: Male     Birth control/protection: Tubal ligation     Review of Systems  The following systems were reviewed and negative:  constitution, eyes, ENT, respiratory, cardiovascular, gastrointestinal, genitourinary, integument, breast, hematologic / lymphatic, musculoskeletal, neurological, behavioral/psych, endocrine, and allergies / immunologic    Objective  Recent Vitals         3/24/2025 2025 2025       BP: 120/70 124/76 124/66     Pulse: 75 -- --     Temp: 97.5 °F (36.4 °C) -- --     Weight: 84.3 kg (185 lb 14.4 oz) 84 kg (185 lb 1.6 oz) 83.9 kg (185 lb)     BMI (Calculated): 30.9 30.8 30.8           Physical  Exam:  General Appearance:  Alert and cooperative, not in any acute distress.   Head:  Atraumatic and normocephalic, without obvious abnormality.   Eyes:          PERRLA, conjunctivae and sclerae normal, no Icterus. No pallor. Extraocular movements are within normal limits.   Ears:  Ears appear intact with no abnormalities noted.   Throat: No oral lesions, no thrush, oral mucosa moist.   Neck: Supple, trachea midline, no thyromegaly, no carotid bruit.   Back:   No kyphoscoliosis present. No tenderness to palpation,   range of motion normal.  No CVAT.   Lungs:   Chest shape is normal. Breath sounds heard bilaterally equally.  No crackles or wheezing. No Pleural rub or bronchial breathing. Normal respiratory effort.   Heart:  Normal S1 and S2, no murmur, no gallop, no rub. No JVD.   Abdomen:   Normal bowel sounds, no masses, no hepatomegaly, no splenomegaly. Soft, non-tender, no guarding, no rebound tenderness.   Extremities: Moves all extremities well, no edema, no cyanosis, no clubbing.  Normal range of motion. Normal strength and tone.   Skin: No bleeding, bruising or rash. No palpable masses noted or induration.   Psychiatric: Alert and oriented  to time, place, and person. Appropriate mood and affect. Thought content organized and appropriate judgment.       Recent Labs  Lab Results (last 7 days)       ** No results found for the last 168 hours. **             Recent Imaging  No radiology results for the last 10 days        Assessment   Menorrhagia with irregular cycles  Dysmenorrhea  Abnormal endometrium on ultrasound  Intramural leiomyoma     Plan   D&C with diagnostic hysteroscopy and MyoSure.  ABx and DVT prophylaxis as indicated.  Risks, complications, benefits, and other alternatives discussed.  All questions answered and pt in agreement with plan.    Dede Kinsey M.D.  5/16/2025  06:37 EDT

## 2025-05-16 NOTE — ANESTHESIA POSTPROCEDURE EVALUATION
Patient: Sophie Dawkins    Procedure Summary       Date: 05/16/25 Room / Location: [facility] OR 1 / [facility] OR    Anesthesia Start: 0846 Anesthesia Stop: 0928    Procedure: DILATATION AND CURETTAGE HYSTEROSCOPY (Vagina) Diagnosis:       Abnormal ultrasound of endometrium      (Abnormal ultrasound of endometrium [R93.5])    Surgeons: Dede Kinsey MD Provider: Mindy Diallo CRNA    Anesthesia Type: MAC ASA Status: 3            Anesthesia Type: MAC    Vitals  Vitals Value Taken Time   /92 05/16/25 09:50   Temp 97.6 °F (36.4 °C) 05/16/25 09:28   Pulse 60 05/16/25 09:48   Resp 16 05/16/25 09:48   SpO2 100 % 05/16/25 09:48   Vitals shown include unfiled device data.        Post Anesthesia Care and Evaluation    Patient location during evaluation: PHASE II  Patient participation: complete - patient participated  Level of consciousness: awake and alert  Pain score: 0  Pain management: satisfactory to patient    Airway patency: patent  Anesthetic complications: No anesthetic complications  PONV Status: none  Cardiovascular status: acceptable and stable  Respiratory status: acceptable  Hydration status: acceptable    Comments: Vitals signs as noted in nursing documentation as per protocol.

## 2025-05-16 NOTE — ANESTHESIA PREPROCEDURE EVALUATION
Anesthesia Evaluation     Patient summary reviewed, Nursing notes reviewed and pregnancy test completed   NPO Solid Status: > 8 hours  NPO Liquid Status: > 2 hours           Airway   Mallampati: II  TM distance: >3 FB  Neck ROM: full  No difficulty expected  Dental - normal exam     Pulmonary - negative pulmonary ROS and normal exam   Cardiovascular - normal exam    Rhythm: regular  Rate: normal    (+) hypertension (monitoring)    ROS comment: Abnormal ECG   Doctor stated small heart murmur, but nothing done.    Neuro/Psych  (+) headaches, psychiatric history Anxiety and Depression  GI/Hepatic/Renal/Endo    (+) obesity, diabetes mellitus (diet controlled) type 2 well controlledGERD: BMI 30.7.    Musculoskeletal (-) negative ROS    Abdominal   (+) obese   Substance History - negative use     OB/GYN negative ob/gyn ROS     Comment: Abnormal ultrasound of endometrium  Abnormal Pap smear of cervix   Pre cancerous cells, used in office freeze treatment    HPV (human papilloma virus) infection Previous g[facility]o- [facility] before closing did a test and said i was positive. No more info than that.              Other   blood dyscrasia anemia,                   Anesthesia Plan    ASA 3     MAC   total IV anesthesia  (Risks and benefits discussed including risk of aspiration, recall and dental damage. All patient questions answered.    Will continue with plan of care.)  intravenous induction     Anesthetic plan, risks, benefits, and alternatives have been provided, discussed and informed consent has been obtained with: patient.  Pre-procedure education provided    CODE STATUS:

## 2025-05-19 ENCOUNTER — TELEMEDICINE (OUTPATIENT)
Age: 35
End: 2025-05-19
Payer: COMMERCIAL

## 2025-05-19 DIAGNOSIS — F34.1 PERSISTENT DEPRESSIVE DISORDER: Primary | ICD-10-CM

## 2025-05-19 DIAGNOSIS — F41.1 GAD (GENERALIZED ANXIETY DISORDER): ICD-10-CM

## 2025-05-19 LAB — REF LAB TEST METHOD: NORMAL

## 2025-05-19 NOTE — PROGRESS NOTES
Follow Up Note       Mode of Visit: Video   Location of patient: -HOME-   Location of provider: +[facility]+   You have chosen to receive care through a telehealth visit.   The patient has signed the video visit consent form.   The visit included audio and video interaction. No technical issues occurred during this visit.           Date Encounter: 2025   Name: Sophie Dawkins  MRN: [id number]  : 1990    Time In: 12:58  Time Out: 13:22     Referring Provider: Shante Grey APRN    Chief Complaint: (F34.1) Persistent depressive disorder    (F41.1) SILAS (generalized anxiety disorder)     History of Present Illness:   Sophie Dawkins is a 34 y.o. female who is being seen today for follow up for individual Psychotherapy session. Patient arrived on time for appointment. Patient requested Video visit for this appointment. Patient was located at there home and used the [place] chat and therapist was located at [facility] on the secure Epic Video Chat,  in a private office for appointment. Patient was dressed appropriately and consented to treatment on Video telehealth call. Patient and therapist completed check in from past visit. Patient reported she had surgery on Friday. Patient reported that she has been off her medication for 2-3 days due to surgery but is taking them daily at this time. Patient rated her anxiety and depression a 6-7 out of 10 with 10 being the worst due to surgery. Patient denied having any suicidal thoughts, plans or intent. Patient reported that she used healthy boundaries when she went camping with her mother on mother's day weekend and her  and children accepted her needs. Therapist used a person centered approach to today's visit. Patient continued to discuss healthy boundaries and being more assertive. Therapist provided homework for patient to monitor moods along with sleep, nutrition, and self care.      Subjective     Assessment Scores:   Last PHQ-9  Score: 7    SILAS-7       Patient's Support Network Includes:  parents    Medications:     Current Outpatient Medications:     desvenlafaxine (PRISTIQ) 50 MG 24 hr tablet, Take 1 tablet by mouth Daily., Disp: 90 tablet, Rfl: 1    ferrous sulfate 325 (65 FE) MG tablet, Take 1 tablet by mouth Daily With Breakfast., Disp: 90 tablet, Rfl: 0    HYDROcodone-acetaminophen (NORCO) 5-325 MG per tablet, Take 1 tablet by mouth Every 6 (Six) Hours As Needed (pain)., Disp: 12 tablet, Rfl: 0    ibuprofen (ADVIL,MOTRIN) 800 MG tablet, Take 1 tablet by mouth Every 8 (Eight) Hours As Needed for Mild Pain., Disp: 30 tablet, Rfl: 0    multivitamin (MULTI VITAMIN PO), Take  by mouth Daily. Stephanie Mariscal's multivitamin + hair growth, Disp: , Rfl:     ondansetron (ZOFRAN) 8 MG tablet, Take 1 tablet by mouth Every 8 (Eight) Hours As Needed for Nausea or Vomiting., Disp: 15 tablet, Rfl: 0    vitamin D (ERGOCALCIFEROL) 1.25 MG (66736 UT) capsule capsule, Take 1 capsule by mouth 1 (One) Time Per Week., Disp: 12 capsule, Rfl: 0    Allergies:   No Known Allergies     Objective       Mental Status Exam:   MENTAL STATUS EXAM   General Appearance:  Cleanly groomed and dressed  Eye Contact:  Good eye contact  Attitude:  Cooperative  Motor Activity:  Normal gait, posture  Muscle Strength:  Normal  Speech:  Normal rate, tone, volume  Language:  Other  Other Comment:  Appropriate  Mood and affect:  Normal, pleasant  Thought Process:  Logical and goal-directed  Associations/ Thought Content:  No delusions  Hallucinations:  None  Suicidal Ideations:  Not present  Homicidal Ideation:  Not present  Sensorium:  Alert and clear  Orientation:  Person, place and time  Immediate Recall, Recent, and Remote Memory:  Intact  Attention Span/ Concentration:  Good  Fund of Knowledge:  Appropriate for age and educational level  Intellectual Functioning:  Average range  Insight:  Good  Judgement:  Good  Reliability:  Good  Impulse Control:  Good       Assessment / Plan       Visit Diagnosis/Orders Placed This Visit:    ICD-10-CM ICD-9-CM   1. Persistent depressive disorder  F34.1 300.4   2. SILAS (generalized anxiety disorder)  F41.1 300.02        PLAN:     Prognosis: Good with ongoing treatment    Safety: Patient denies SI/HI.  Therapist and patient reviewed options for help including 911, 988 the suicide hotline, and going to the neared ER.  Therapist will continue to monitor.   Risk Assessment: Risk of self-harm acutely is low. Risk of self-harm chronically is also low, but could be further elevated in the event of treatment noncompliance and/or AODA.    Treatment Plan/Goals: Continue supportive psychotherapy efforts and medications as indicated. Treatment and medication options discussed during today's visit. Patient acknowledged and verbally consented to continue with current treatment plan and was educated on the importance of compliance with treatment and follow-up appointments. Patient seems reasonably able to adhere to treatment plan.      Assisted Patient in processing above session content; acknowledged and normalized patient’s thoughts, feelings, and concerns.      Allowed Patient to freely discuss issues  without interruption or judgement with unconditional positive regard, active listening skills, and empathy. Therapist provided a safe, confidential environment to facilitate the development of a positive therapeutic relationship and encouraged open, honest communication. Assisted Patient in identifying risk factors which would indicate the need for higher level of care including thoughts to harm self or others and/or self-harming behavior and encouraged Patient to contact this office, call 911, 988, or present to the nearest emergency room should any of these events occur. Discussed crisis intervention services and means to access. Patient adamantly and convincingly denies current suicidal or homicidal ideation or perceptual disturbance. Assisted Patient in processing  session content; acknowledged and normalized Patient’s thoughts, feelings, and concerns by utilizing a person-centered approach in efforts to build appropriate rapport and a positive therapeutic relationship with open and honest communication.     Follow Up:   Return in about 1 month (around 6/19/2025).       LYNN Tierney   [facility]

## 2025-05-27 ENCOUNTER — OFFICE VISIT (OUTPATIENT)
Dept: OBSTETRICS AND GYNECOLOGY | Facility: CLINIC | Age: 35
End: 2025-05-27
Payer: COMMERCIAL

## 2025-05-27 VITALS
DIASTOLIC BLOOD PRESSURE: 80 MMHG | HEIGHT: 65 IN | BODY MASS INDEX: 31.09 KG/M2 | WEIGHT: 186.6 LBS | SYSTOLIC BLOOD PRESSURE: 122 MMHG

## 2025-05-27 DIAGNOSIS — Z98.890 S/P D&C (STATUS POST DILATION AND CURETTAGE): ICD-10-CM

## 2025-05-27 DIAGNOSIS — Z09 POSTOPERATIVE FOLLOW-UP: Primary | ICD-10-CM

## 2025-05-27 NOTE — PROGRESS NOTES
Subjective   Chief Complaint   Patient presents with    Post-op     11 days post-op D&C Hysteroscopy, doing well       Sophie Dawkins is a 34 y.o. year old  presenting to be seen for post op check.  Reports doing well post procedure  Normal bowel and bladder function  No vaginal bleeding at this point  Pathology benign-secretory endometrium and polyp     Past Medical History:   Diagnosis Date    Abnormal ECG     Doctor stated small heart murmur, but nothing done.    Abnormal Pap smear of cervix 2006    Pre cancerous cells, used in office freeze treatment.    Anemia     Anxiety     Chronic ear infection     right ear mostly    Diabetes mellitus 23    Controlled.    Headache     Heavy menses     Herpes 2018    HPV (human papilloma virus) infection     Previous gyno- [facility] before closing did a test and said i was positive. No more info than that.    Hypertension 23     Tracking for a month.    Irregular menses     Multiple gestation ,     2 kids, natural births, tubes tied.    Persistent depressive disorder 2025    Postoperative wound infection 2025    Cat Scratch Fever - Healed    Rh incompatibility     Took shots with both pregnancies.    Vitamin D deficiency         Current Outpatient Medications:     desvenlafaxine (PRISTIQ) 50 MG 24 hr tablet, Take 1 tablet by mouth Daily., Disp: 90 tablet, Rfl: 1    multivitamin (MULTI VITAMIN PO), Take  by mouth Daily. Stephanie Mariscal's multivitamin + hair growth, Disp: , Rfl:     vitamin D (ERGOCALCIFEROL) 1.25 MG (09421 UT) capsule capsule, Take 1 capsule by mouth 1 (One) Time Per Week., Disp: 12 capsule, Rfl: 0   No Known Allergies   Past Surgical History:   Procedure Laterality Date    D & C HYSTEROSCOPY MYOSURE N/A 2025    Procedure: DILATATION AND CURETTAGE HYSTEROSCOPY;  Surgeon: Dede Kinsey MD;  Location: [facility];  Service: Obstetrics/Gynecology;  Laterality: N/A;    GYNECOLOGIC CRYOSURGERY      LYMPH NODE  "BIOPSY      right groin    TUBAL ABDOMINAL LIGATION  2010    After second birth of child.    WISDOM TOOTH EXTRACTION        Social History     Socioeconomic History    Marital status:    Tobacco Use    Smoking status: Never    Smokeless tobacco: Never   Vaping Use    Vaping status: Never Used   Substance and Sexual Activity    Alcohol use: Not Currently     Comment: Rarely, for special occasions.    Drug use: Never    Sexual activity: Yes     Partners: Male     Birth control/protection: Tubal ligation      Family History   Problem Relation Age of Onset    Diabetes Father         Takes meds for diabetes.    Depression Father     Hyperlipidemia Father     Hypertension Father     Migraines Father     Obesity Father     Diabetes Maternal Grandmother         Takes meds for diabetes.    Hyperlipidemia Maternal Grandmother     Vision loss Maternal Grandmother         Glaucoma / Wet Macular Degeneration    Hypertension Maternal Grandmother     Anxiety disorder Mother     Depression Mother     Hyperlipidemia Mother     Thyroid disease Mother         Grave’s Disease    Hypertension Mother     Obesity Mother     Heart disease Maternal Grandfather          of heart attack with sepsis.    Hyperlipidemia Maternal Grandfather     Hypertension Maternal Grandfather        Review of Systems   Constitutional: Negative.    Gastrointestinal: Negative.    Genitourinary: Negative.            Objective   /80   Ht 165.1 cm (65\")   Wt 84.6 kg (186 lb 9.6 oz)   LMP 2025 (Exact Date)   BMI 31.05 kg/m²     Physical Exam  Constitutional:       Appearance: Normal appearance. She is well-developed and well-groomed.   Eyes:      General: Lids are normal.      Extraocular Movements: Extraocular movements intact.      Conjunctiva/sclera: Conjunctivae normal.   Neurological:      General: No focal deficit present.      Mental Status: She is alert and oriented to person, place, and time.   Psychiatric:         Attention and " Perception: Attention normal.         Mood and Affect: Mood normal.         Speech: Speech normal.         Behavior: Behavior is cooperative.            Result Review :           TISSUE EXAM, [facility] ([facility]) (05/16/2025 09:05)         Assessment and Plan  Diagnoses and all orders for this visit:    1. Postoperative follow-up (Primary)    2. S/P D&C (status post dilation and curettage)      Patient Instructions   Keep next scheduled appt for annual/pap            This note was electronically signed.    Sindi Duffy PA-C   May 27, 2025

## 2025-06-03 ENCOUNTER — OFFICE VISIT (OUTPATIENT)
Dept: NEUROLOGY | Facility: CLINIC | Age: 35
End: 2025-06-03
Payer: COMMERCIAL

## 2025-06-03 VITALS
OXYGEN SATURATION: 95 % | HEART RATE: 85 BPM | BODY MASS INDEX: 31.37 KG/M2 | TEMPERATURE: 97.5 F | SYSTOLIC BLOOD PRESSURE: 158 MMHG | WEIGHT: 188.3 LBS | HEIGHT: 65 IN | DIASTOLIC BLOOD PRESSURE: 105 MMHG

## 2025-06-03 DIAGNOSIS — R06.83 SNORING: Primary | ICD-10-CM

## 2025-06-03 DIAGNOSIS — G47.9 RESTLESS SLEEPER: ICD-10-CM

## 2025-06-03 DIAGNOSIS — G47.19 EXCESSIVE DAYTIME SLEEPINESS: ICD-10-CM

## 2025-06-03 NOTE — PROGRESS NOTES
New Sleep Patient Office Visit      Patient Name: Sophie Dawkins  : 1990   MRN: [id number]     Referring Physician: Shante Grey APRN    Chief Complaint:    Chief Complaint   Patient presents with    Consult     NP, in office to establish care for brian. Patient c/o being tired when she wakes up like she's never even slept. Patient c/o brain fog, headaches upon wakening, snoring, gasping for air.         History of Present Illness: Sophie Dawkins is a 34 y.o. female who is here today to establish care with Sleep Medicine for possible BRIAN.  She complains of daytime sleepiness, waking up with headaches and snoring.  Sleep questionnaire reviewed- it takes her 30-60 minutes to fall asleep at night, she wakes up 2-3 times during sleep and is able to fall back to sleep, she sleeps 6-10 hours per night, she experiences restless or disturbed sleep, she takes a nap on 1-2 days per week, she snores very loud, she has awakened gasping and choking, she wakes up with a dry mouth, she experiences memory loss and decreased concentration, she has decreased libido, she has leg kicking/leg movements while sleeping, she has nightmares on some nights, she grinds her teeth during sleep frequently, she has heartburn that interferes with sleep.  Additional risk factors- family history of BRIAN, mood disorder, elevated BMI.     [place] Score: 10    Subjective      Review of Systems:   Review of Systems   Constitutional:  Positive for fatigue.   Neurological:  Positive for headache.   Psychiatric/Behavioral:  Positive for sleep disturbance.        Past Medical History:   Past Medical History:   Diagnosis Date    Abnormal ECG     Doctor stated small heart murmur, but nothing done.    Abnormal Pap smear of cervix 2006    Pre cancerous cells, used in office freeze treatment.    Anemia     Anxiety     Chronic ear infection     right ear mostly    Diabetes mellitus 23    Controlled.    Headache     Heavy menses     Herpes  2018    HPV (human papilloma virus) infection 2018    Previous g[facility]o- [facility] before closing did a test and said i was positive. No more info than that.    Hypertension 25    Dr. De La Torre for a month.    Irregular menses     Multiple gestation ,     2 kids, natural births, tubes tied.    Persistent depressive disorder 2025    Postoperative wound infection 2025    Cat Scratch Fever - Healed    Rh incompatibility     Took shots with both pregnancies.    Vitamin D deficiency        Past Surgical History:   Past Surgical History:   Procedure Laterality Date    D & C HYSTEROSCOPY MYOSURE N/A 2025    Procedure: DILATATION AND CURETTAGE HYSTEROSCOPY;  Surgeon: Dede Kinsey MD;  Location: [facility];  Service: Obstetrics/Gynecology;  Laterality: N/A;    GYNECOLOGIC CRYOSURGERY      LYMPH NODE BIOPSY      right groin    TUBAL ABDOMINAL LIGATION      After second birth of child.    WISDOM TOOTH EXTRACTION         Family History:   Family History   Problem Relation Age of Onset    Diabetes Father         Takes meds for diabetes.    Depression Father     Hyperlipidemia Father     Hypertension Father     Migraines Father     Obesity Father     Diabetes Maternal Grandmother         Takes meds for diabetes.    Hyperlipidemia Maternal Grandmother     Vision loss Maternal Grandmother         Glaucoma / Wet Macular Degeneration    Hypertension Maternal Grandmother     Anxiety disorder Mother     Depression Mother     Hyperlipidemia Mother     Thyroid disease Mother         Grave’s Disease    Hypertension Mother     Obesity Mother     Heart disease Maternal Grandfather          of heart attack with sepsis.    Hyperlipidemia Maternal Grandfather     Hypertension Maternal Grandfather        Social History:   Social History     Socioeconomic History    Marital status:    Tobacco Use    Smoking status: Never    Smokeless tobacco: Never   Vaping Use    Vaping status: Never Used  "  Substance and Sexual Activity    Alcohol use: Not Currently     Comment: Rarely, for special occasions.    Drug use: Never    Sexual activity: Yes     Partners: Male     Birth control/protection: Tubal ligation       Medications:     Current Outpatient Medications:     desvenlafaxine (PRISTIQ) 50 MG 24 hr tablet, Take 1 tablet by mouth Daily., Disp: 90 tablet, Rfl: 1    multivitamin (MULTI VITAMIN PO), Take  by mouth Daily. Stephanie Rivera multivitamin + hair growth, Disp: , Rfl:     vitamin D (ERGOCALCIFEROL) 1.25 MG (72568 UT) capsule capsule, Take 1 capsule by mouth 1 (One) Time Per Week., Disp: 12 capsule, Rfl: 0    Allergies:   No Known Allergies    Objective     Physical Exam:  Vital Signs:   Vitals:    06/03/25 1325   BP: (!) 158/105   BP Location: Left arm   Patient Position: Sitting   Cuff Size: Adult   Pulse: 85   Temp: 97.5 °F (36.4 °C)   SpO2: 95%   Weight: 85.4 kg (188 lb 4.8 oz)   Height: 165.1 cm (65\")   PainSc: 0-No pain     BMI: Body mass index is 31.33 kg/m².    Physical Exam  Vitals and nursing note reviewed.   Constitutional:       General: She is not in acute distress.     Appearance: Normal appearance. She is well-developed. She is obese. She is not diaphoretic.   HENT:      Head: Normocephalic and atraumatic.      Mouth/Throat:      Comments: Mallampati 4 with enlarged tonsils  Eyes:      Extraocular Movements: Extraocular movements intact.      Conjunctiva/sclera: Conjunctivae normal.   Neck:      Trachea: Trachea normal.   Pulmonary:      Effort: Pulmonary effort is normal. No respiratory distress.   Musculoskeletal:         General: Normal range of motion.   Skin:     General: Skin is warm and dry.   Neurological:      Mental Status: She is alert and oriented to person, place, and time.   Psychiatric:         Mood and Affect: Mood normal.         Behavior: Behavior normal.         Thought Content: Thought content normal.         Judgment: Judgment normal.       Assessment / Plan  "     Assessment/Plan:   Diagnoses and all orders for this visit:    1. Snoring (Primary)  -     Home Sleep Study; Future    2. Excessive daytime sleepiness  -     Home Sleep Study; Future    3. Restless sleeper  -     Home Sleep Study; Future       *Patient education on ROMI provided today.   *PSG offered but patient prefers a home sleep study.       Follow Up:   Return in about 3 months (around 9/3/2025) for F/U Obstructive Sleep Apnea.    I have advised patient the gold standard for treatment of sleep apnea includes weight loss, use of cpap and avoidance of alcohol.  Encouraged weight loss (if applicable) with a BMI goal of 24.  I have discussed the implications of untreated significant ROMI- the development of hypertension, increased risk of cardiovascular disease, increased risk of stroke, work-related issues and driving accidents. I have counseled and advised the patient to avoid driving or operating heavy/dangerous equipment if feeling drowsy.     LUCY Dale, FNP-C  [facility]

## 2025-06-06 ENCOUNTER — PATIENT ROUNDING (BHMG ONLY) (OUTPATIENT)
Dept: NEUROLOGY | Facility: CLINIC | Age: 35
End: 2025-06-06
Payer: COMMERCIAL

## 2025-06-10 NOTE — PROGRESS NOTES
Patient: Sophie Dawkins  YOB: 1990    Date: 06/23/2025    Primary Care Provider: Shante Grey APRN    Chief Complaint   Patient presents with    Follow-up     thigh abscess       History: The patient is in the office today in for a 3 month follow up thigh abscess. Incision and drainage was performed on 2/7/25. Pathology did show organized fat necrosis with hemorrhage and abscess formation. 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 Patient states that she still feels nodules in the area, but it is improved from what it was before. She only has discomfort if she sits a certain way. Incisions are well healing. She denies drainage from the area.     Patient was also referred back to me by GYN. Patient was having heavy/irregular periods and abdominal pain. She recently underwent D&C and reports that her symptoms have improved significantly. They completed a follow up CT that abdomen/pelvis showed sclerosing mesenteritis and a uterine fibroid. Patient denies nausea, vomiting, issues with bowel movements. She does report having reflux and she used to take Pepcid for this, but stopped.     The following portions of the patient's history were reviewed and updated as appropriate: allergies, current medications, past family history, past medical history, past social history, past surgical history and problem list.      Review of Systems:  Constitutional:  Negative for chills, fever, and unexpected weight change.  HENT: Negative for trouble swallowing and voice change.  Eyes:  Negative for visual disturbance.  Respiratory:  Negative for apnea, cough, chest tightness, shortness of breath, and wheezing.  Cardiovascular:  Negative for chest pain, palpitations, and leg swelling.  Gastrointestinal:  Negative for abdominal distention, abdominal pain, anal bleeding, blood in stool, constipation, diarrhea, nausea, rectal pain, and  "vomiting.  Musculoskeletal:  Negative for back pain, gait problem, and joint swelling.  Skin:  Negative for color change, rash, and wound  Neurological:  Negative for dizziness, syncope, speech difficulty, weakness, numbness, and headaches.  Hematological:  Negative for adenopathy.  Does not bruise/bleed easily.  Psychiatric/Behavioral:  Negative for confusion.  The patient is not nervous/anxious.    Vital Signs  Vitals:    06/23/25 1239   BP: 128/80   Pulse: 80   Resp: 18   Temp: 97.7 °F (36.5 °C)   TempSrc: Temporal   SpO2: 98%   Weight: 86.9 kg (191 lb 9.6 oz)   Height: 165.1 cm (65\")       Allergies:  No Known Allergies    Medications:    Current Outpatient Medications:     desvenlafaxine (PRISTIQ) 50 MG 24 hr tablet, Take 1 tablet by mouth Daily., Disp: 90 tablet, Rfl: 1    multivitamin (MULTI VITAMIN PO), Take  by mouth Daily. tSephanie Mariscal's multivitamin + hair growth, Disp: , Rfl:     vitamin D (ERGOCALCIFEROL) 1.25 MG (56203 UT) capsule capsule, Take 1 capsule by mouth 1 (One) Time Per Week., Disp: 12 capsule, Rfl: 0    Physical Exam:     General Appearance:    Alert, cooperative, in no acute distress   Head:    Normocephalic, without obvious abnormality, atraumatic   Eyes:            Lids and lashes normal, conjunctivae and sclerae normal, no   icterus, no pallor, corneas clear, PERRLA   Throat:   No oral lesions, no thrush, oral mucosa moist   Lungs:     Clear to auscultation,respirations regular, even and                  unlabored    Heart:    Regular rhythm and normal rate, normal S1 and S2, no            murmur, no gallop, no rub, no click   Abdomen:     Normal bowel sounds, no masses, no organomegaly, soft        non-tender, non-distended, no guarding   Extremities:   Moves all extremities well, no edema, no cyanosis, no             redness   Pulses:   Pulses palpable and equal bilaterally   Skin:   Right thigh incisions are well healing, she does still have palpable adenopathy underlying incisions. No " evidence of infection or drainage.    Neurologic:   Cranial nerves 2 - 12 grossly intact, sensation intact, DTR       present and equal bilaterally          Results Review:   I reviewed the patient's new clinical results.  I reviewed the patient's new imaging results and agree with the interpretation.     Review of Systems was reviewed and confirmed as accurate as documented by the MA.    ASSESSMENT/PLAN:    1. Lymphadenitis    2. Gastroesophageal reflux disease, unspecified whether esophagitis present    3. Sclerosing mesenteritis      Patient was seen today as a three month follow up for right groin lymphadenitis and suspected Bartonella infection. The area appears improved with no further evidence of infection, however, she does still have lymphadenopathy. We repeated her right groin ultrasound today which showed two enlarged lymph nodes about the same size, however, the fluid collection has resolved and overall there are fewer enlarged lymph nodes. It seems that things are slowly resolving so I discussed repeating an ultrasound in another 3 months for surveillance.    Regarding her CT findings, this is a relatively non-specific radiographic finding. The patient did have abdominal pain, but this has since resolved since her D&C as has her menorrhagia. I think her symptoms were more likely related to a GYN source. Will continue to monitor for abdominal pain, I do not think the patient requires treatment at this time. She does report GERD symptoms and I recommend she trial a PPI for 6-8 weeks. We also discussed anti-reflux diet and lifestyle modifications and the patient will try to adhere to these.     She is agreeable to the plan and all questions were answered.      Electronically signed by Franny Cornejo DO  06/23/25

## 2025-06-11 ENCOUNTER — HOSPITAL ENCOUNTER (OUTPATIENT)
Dept: CT IMAGING | Facility: HOSPITAL | Age: 35
Discharge: HOME | End: 2025-06-11
Admitting: OBSTETRICS & GYNECOLOGY
Payer: COMMERCIAL

## 2025-06-11 DIAGNOSIS — E27.9 ADRENAL ABNORMALITY: ICD-10-CM

## 2025-06-11 PROCEDURE — 74178 CT ABD&PLV WO CNTR FLWD CNTR: CPT

## 2025-06-11 PROCEDURE — 25510000001 IOPAMIDOL 61 % SOLUTION: Performed by: OBSTETRICS & GYNECOLOGY

## 2025-06-11 RX ORDER — IOPAMIDOL 612 MG/ML
100 INJECTION, SOLUTION INTRAVASCULAR
Status: COMPLETED | OUTPATIENT
Start: 2025-06-11 | End: 2025-06-11

## 2025-06-11 RX ADMIN — IOPAMIDOL 100 ML: 612 INJECTION, SOLUTION INTRAVENOUS at 07:46

## 2025-06-13 DIAGNOSIS — R93.5 ABNORMAL CT SCAN, PELVIS: Primary | ICD-10-CM

## 2025-06-23 ENCOUNTER — OFFICE VISIT (OUTPATIENT)
Dept: SURGERY | Facility: CLINIC | Age: 35
End: 2025-06-23
Payer: COMMERCIAL

## 2025-06-23 VITALS
SYSTOLIC BLOOD PRESSURE: 128 MMHG | RESPIRATION RATE: 18 BRPM | TEMPERATURE: 97.7 F | WEIGHT: 191.6 LBS | BODY MASS INDEX: 31.92 KG/M2 | DIASTOLIC BLOOD PRESSURE: 80 MMHG | OXYGEN SATURATION: 98 % | HEIGHT: 65 IN | HEART RATE: 80 BPM

## 2025-06-23 DIAGNOSIS — K65.4 SCLEROSING MESENTERITIS: ICD-10-CM

## 2025-06-23 DIAGNOSIS — K21.9 GASTROESOPHAGEAL REFLUX DISEASE, UNSPECIFIED WHETHER ESOPHAGITIS PRESENT: ICD-10-CM

## 2025-06-23 DIAGNOSIS — I88.9 LYMPHADENITIS: Primary | ICD-10-CM

## 2025-06-23 PROCEDURE — 99214 OFFICE O/P EST MOD 30 MIN: CPT | Performed by: STUDENT IN AN ORGANIZED HEALTH CARE EDUCATION/TRAINING PROGRAM

## 2025-06-25 ENCOUNTER — HOSPITAL ENCOUNTER (OUTPATIENT)
Dept: SLEEP MEDICINE | Facility: HOSPITAL | Age: 35
Discharge: HOME | End: 2025-06-25
Admitting: NURSE PRACTITIONER
Payer: COMMERCIAL

## 2025-06-25 DIAGNOSIS — G47.9 RESTLESS SLEEPER: ICD-10-CM

## 2025-06-25 DIAGNOSIS — R06.83 SNORING: ICD-10-CM

## 2025-06-25 DIAGNOSIS — G47.19 EXCESSIVE DAYTIME SLEEPINESS: ICD-10-CM

## 2025-06-25 PROCEDURE — G0399 HOME SLEEP TEST/TYPE 3 PORTA: HCPCS

## 2025-07-05 DIAGNOSIS — E55.9 VITAMIN D DEFICIENCY: ICD-10-CM

## 2025-07-07 DIAGNOSIS — E55.9 VITAMIN D DEFICIENCY: Primary | ICD-10-CM

## 2025-07-07 RX ORDER — ERGOCALCIFEROL 1.25 MG/1
50000 CAPSULE, LIQUID FILLED ORAL WEEKLY
Qty: 4 CAPSULE | Refills: 0 | Status: SHIPPED | OUTPATIENT
Start: 2025-07-07

## 2025-07-07 NOTE — TELEPHONE ENCOUNTER
Rx Refill Note  Requested Prescriptions     Pending Prescriptions Disp Refills    vitamin D (ERGOCALCIFEROL) 1.25 MG (61760 UT) capsule capsule [Pharmacy Med Name: Vitamin D (Ergocalciferol) Oral Capsule 1.25 MG (96626 UT)] 12 capsule 0     Sig: TAKE 1 CAPSULE BY MOUTH 1 TIME A WEEK      Last office visit with prescribing clinician: 3/17/2025   Last telemedicine visit with prescribing clinician: Visit date not found   Next office visit with prescribing clinician: Visit date not found                         Klaudia Charles MA  07/07/25, 07:53 EDT

## 2025-07-16 ENCOUNTER — OFFICE VISIT (OUTPATIENT)
Dept: OBSTETRICS AND GYNECOLOGY | Facility: CLINIC | Age: 35
End: 2025-07-16
Payer: COMMERCIAL

## 2025-07-16 VITALS
DIASTOLIC BLOOD PRESSURE: 76 MMHG | WEIGHT: 191 LBS | BODY MASS INDEX: 30.7 KG/M2 | SYSTOLIC BLOOD PRESSURE: 128 MMHG | HEIGHT: 66 IN

## 2025-07-16 DIAGNOSIS — N94.6 DYSMENORRHEA: ICD-10-CM

## 2025-07-16 DIAGNOSIS — R79.89 ELEVATED TESTOSTERONE LEVEL IN FEMALE: ICD-10-CM

## 2025-07-16 DIAGNOSIS — Z01.411 ENCOUNTER FOR GYNECOLOGICAL EXAMINATION (GENERAL) (ROUTINE) WITH ABNORMAL FINDINGS: Primary | ICD-10-CM

## 2025-07-16 DIAGNOSIS — N92.0 MENORRHAGIA WITH REGULAR CYCLE: ICD-10-CM

## 2025-07-16 DIAGNOSIS — E27.9 ADRENAL ABNORMALITY: ICD-10-CM

## 2025-07-16 DIAGNOSIS — Z12.39 ENCOUNTER FOR BREAST CANCER SCREENING OTHER THAN MAMMOGRAM: ICD-10-CM

## 2025-07-16 DIAGNOSIS — K65.4 SCLEROSING MESENTERITIS: ICD-10-CM

## 2025-07-16 DIAGNOSIS — Z01.419 ENCOUNTER FOR GYNECOLOGICAL EXAMINATION (GENERAL) (ROUTINE) WITHOUT ABNORMAL FINDINGS: ICD-10-CM

## 2025-07-17 LAB — REF LAB TEST METHOD: NORMAL

## 2025-07-17 NOTE — PROGRESS NOTES
Chief Complaint  Gynecologic Exam     History of Present Illness:  Patient is 34 y.o.  who presents to [facility] here for her annual examination.  Patient had her last menstrual cycle at the beginning of July.  She reports her menstrual cycles are regular in nature.  Her flow has improved since her D&C.  Her menstrual cycles however are now lasting longer up to 5 days.  She does have cramping associated with her menstrual cycles.  Patient did have a previous CT scan of her abdomen and pelvis for evaluation of adrenal abnormality as well as elevated free testosterone.  She reports there is a denial of the pelvic portion.  Of note her CT scan showed sclerosing mesenteritis.  Patient did see the general surgeon.  It was recommended that patient see gastroenterology.  The patient is followed by general surgeon for thigh abscess.  She did have ultrasound as noted.    History  Past Medical History:   Diagnosis Date    Abnormal ECG     Doctor stated small heart murmur, but nothing done.    Abnormal Pap smear of cervix     Pre cancerous cells, used in office freeze treatment.    Anemia     Anxiety     Chronic ear infection     right ear mostly    Diabetes mellitus 23    Controlled.    Endometriosis 2025    C&D Performed    Fibroid 2025    C&D Performed    Headache     Heavy menses     Herpes 2018    HPV (human papilloma virus) infection 2018    Previous gyno- [facility] before closing did a test and said i was positive. No more info than that.    Hypertension 25    Dr. De La Torre for a month.    Irregular menses     Multiple gestation ,     2 kids, natural births, tubes tied.    Ovarian cyst 2025    Persistent depressive disorder 2025    Postoperative wound infection 2025    Cat Scratch Fever - Healed    Rh incompatibility     Took shots with both pregnancies.    Vitamin D deficiency      Current Outpatient Medications on File Prior to  Visit   Medication Sig Dispense Refill    desvenlafaxine (PRISTIQ) 50 MG 24 hr tablet Take 1 tablet by mouth Daily. 90 tablet 1    multivitamin (MULTI VITAMIN PO) Take  by mouth Daily. Stephanie Mariscal's multivitamin + hair growth      vitamin D (ERGOCALCIFEROL) 1.25 MG (31713 UT) capsule capsule TAKE 1 CAPSULE BY MOUTH 1 TIME A WEEK 4 capsule 0     No current facility-administered medications on file prior to visit.     No Known Allergies  Past Surgical History:   Procedure Laterality Date    D & C HYSTEROSCOPY MYOSURE N/A 2025    Procedure: DILATATION AND CURETTAGE HYSTEROSCOPY;  Surgeon: Dede Kinsey MD;  Location: [facility];  Service: Obstetrics/Gynecology;  Laterality: N/A;    DILATATION AND CURETTAGE  2025    GYNECOLOGIC CRYOSURGERY      LYMPH NODE BIOPSY      right groin    TUBAL ABDOMINAL LIGATION      After second birth of child.    WISDOM TOOTH EXTRACTION       Family History   Problem Relation Age of Onset    Diabetes Father         Takes meds for diabetes.    Depression Father     Hyperlipidemia Father     Hypertension Father     Migraines Father     Obesity Father     Diabetes Maternal Grandmother         Takes meds for diabetes.    Hyperlipidemia Maternal Grandmother     Vision loss Maternal Grandmother         Glaucoma / Wet Macular Degeneration    Hypertension Maternal Grandmother     Anxiety disorder Mother     Depression Mother     Hyperlipidemia Mother     Thyroid disease Mother         Grave’s Disease    Hypertension Mother     Obesity Mother     Heart disease Maternal Grandfather          of heart attack with sepsis.    Hyperlipidemia Maternal Grandfather     Hypertension Maternal Grandfather      Social History     Socioeconomic History    Marital status:    Tobacco Use    Smoking status: Never    Smokeless tobacco: Never   Vaping Use    Vaping status: Never Used   Substance and Sexual Activity    Alcohol use: Not Currently     Comment: Rarely, for special occasions.    Drug  "use: Never    Sexual activity: Yes     Partners: Male     Birth control/protection: Tubal ligation       Physical Examination:  Vital Signs: /76   Ht 167.6 cm (66\")   Wt 86.6 kg (191 lb)   BMI 30.83 kg/m²     General Appearance: alert, appears stated age, and cooperative  Breasts: Examined in supine position  Symmetric without masses or skin dimpling  Nipples normal without inversion, lesions or discharge  There are no palpable axillary nodes  Abdomen: no masses, no hepatomegaly, no splenomegaly, soft non-tender, no guarding, and no rebound tenderness  Pelvic: Clinical staff was present for exam; pelvic examination required for evaluation.  External genitalia:  normal appearance of the external genitalia including Bartholin's and Barahona's glands.  :  urethral meatus normal;  Vaginal:  normal pink mucosa without prolapse or lesions.  Cervix:  normal appearance.  Uterus:  normal size, shape and consistency.  Adnexa:  normal bimanual exam of the adnexa.  Pap smear done and specimen sent using Thin-Prep technique    Data Review:  The following data was reviewed by: Dede Kinsey MD on 07/16/2025:     Labs:  Testosterone, Free, Total (04/15/2025 08:58)  DHEA (04/15/2025 08:58)  DHEA-Sulfate (04/15/2025 08:58)  CBC & Differential (04/15/2025 08:58)  Hemoglobin A1c (04/15/2025 08:58)  Insulin, Total (04/15/2025 08:58)  Cortisol - AM (04/15/2025 08:58)  CBC and Differential (05/01/2025 09:50)  Urinalysis With Culture If Indicated - Urine, Random Void (05/01/2025 09:50)  Imaging:  CT Abdomen Pelvis With & Without Contrast (06/11/2025 07:46)  US Soft Tissue (06/23/2025 15:19)  Medical Records:  Progress Notes by Franny Cornejo DO (06/23/2025 12:30)     Assessment and Plan   1. Encounter for gynecological examination (general) (routine) with abnormal findings  Pap was done today.  If she does not receive the results of the Pap within 2 weeks  time, she was instructed to call to find out the results.  I explained " to Sophie that the recommendations for Pap smear interval in a low risk patient has lengthened to 3 years time if cytology alone normal or  5 years time if both cytology and HPV testing were normal.  I encouraged her to be seen yearly for a full physical exam including breast and pelvic exam even during the off years when PAP's will not be performed.   - LIQUID-BASED PAP SMEAR WITH HPV GENOTYPING IF ASCUS ([facility])    2. Encounter for breast cancer screening other than mammogram  Sophie was counseled regarding having clinical breast exams and breast self-awareness.  Women aged 29-39 years of age should have clinical breast exams every 1-3 years and yearly aged 40 and older.  The patient was counseled regarding breast self-awareness focusing on having a sense of what is normal for her breasts so that she can tell if there are changes.  Even small changes should be reported to provider.     3. Adrenal abnormality  Patient with abnormal DHEA-S.  CT scan reviewed as noted.  Patient does have an appointment with endocrinology next month.    4. Elevated testosterone level in female  Patient with elevated testosterone level.  She did have CT scan as noted.    5. Menorrhagia with regular cycle  Patient with continued menorrhagia.  Will continue to monitor at present.  Patient to call if worsening and/or changes in her symptoms.    6. Dysmenorrhea  Sophie Dawkins was counseled regarding the various etiologies for dysmenorrhea.  The patient was informed that primary dysmenorrhea is painful menstruation in the absence of pathology.  The various options for dysmenorrhea were discussed to include nonsteroidal antiinflammatory drugs, hormonal suppression, or both.  The patient was informed secondary dysmenorrhea is a result of pelvic pathology and is more common in patients with severe dysmenorrhea at menarche or progressively worsening dysmenorrhea, abnormal uterine bleeding, mid-cycle or acyclic pain, infertility, family  history of endometriosis, dyspareunia, or lack of response to empiric therapy.  Evaluation for secondary causes includes pelvic ultrasonography and possible laparoscopy.  The various treatment options for secondary dysmenorrhea depends upon the etiology as discussed.       7. Sclerosing mesenteritis  Referral made to gastroenterology as noted.  Instructions and precautions have been given.  - Ambulatory Referral to Gastroenterology    Follow Up/Instructions:  Follow up as noted.  Patient was given instructions and counseling regarding her condition or for health maintenance advice. Please see specific information pulled into the AVS if appropriate.     Note: Speech recognition transcription software may have been used to dictate portions of this document.  An attempt at proofreading has been made though minor errors in transcription may still be present.    This note was electronically signed.  Dede Kinsey M.D.

## 2025-08-14 ENCOUNTER — OFFICE VISIT (OUTPATIENT)
Dept: BEHAVIORAL HEALTH | Facility: CLINIC | Age: 35
End: 2025-08-14
Payer: COMMERCIAL

## 2025-08-14 VITALS
BODY MASS INDEX: 29.89 KG/M2 | HEIGHT: 66 IN | WEIGHT: 186 LBS | SYSTOLIC BLOOD PRESSURE: 126 MMHG | DIASTOLIC BLOOD PRESSURE: 82 MMHG

## 2025-08-14 DIAGNOSIS — F41.1 GAD (GENERALIZED ANXIETY DISORDER): ICD-10-CM

## 2025-08-14 DIAGNOSIS — F90.0 ADHD (ATTENTION DEFICIT HYPERACTIVITY DISORDER), INATTENTIVE TYPE: ICD-10-CM

## 2025-08-14 DIAGNOSIS — F34.1 PERSISTENT DEPRESSIVE DISORDER: Primary | ICD-10-CM

## 2025-08-14 RX ORDER — DEXTROAMPHETAMINE SACCHARATE, AMPHETAMINE ASPARTATE, DEXTROAMPHETAMINE SULFATE AND AMPHETAMINE SULFATE 2.5; 2.5; 2.5; 2.5 MG/1; MG/1; MG/1; MG/1
10 TABLET ORAL 2 TIMES DAILY
Qty: 60 TABLET | Refills: 0 | Status: SHIPPED | OUTPATIENT
Start: 2025-08-14

## 2025-08-26 ENCOUNTER — OFFICE VISIT (OUTPATIENT)
Age: 35
End: 2025-08-26
Payer: COMMERCIAL

## 2025-08-26 VITALS
SYSTOLIC BLOOD PRESSURE: 130 MMHG | HEART RATE: 78 BPM | WEIGHT: 193 LBS | OXYGEN SATURATION: 98 % | HEIGHT: 66 IN | DIASTOLIC BLOOD PRESSURE: 77 MMHG | BODY MASS INDEX: 31.02 KG/M2

## 2025-08-26 DIAGNOSIS — E28.2 PCOS (POLYCYSTIC OVARIAN SYNDROME): Primary | ICD-10-CM

## 2025-08-26 DIAGNOSIS — E55.9 VITAMIN D DEFICIENCY: ICD-10-CM

## 2025-08-26 DIAGNOSIS — E53.8 B12 DEFICIENCY: ICD-10-CM

## 2025-08-26 DIAGNOSIS — Z86.39 HISTORY OF TYPE 2 DIABETES MELLITUS: ICD-10-CM

## 2025-08-26 PROBLEM — E11.65 TYPE 2 DIABETES MELLITUS WITH HYPERGLYCEMIA, WITHOUT LONG-TERM CURRENT USE OF INSULIN: Status: RESOLVED | Noted: 2025-03-17 | Resolved: 2025-08-26

## 2025-08-26 PROBLEM — R79.89 ELEVATED DEHYDROEPIANDROSTERONE (DHEA) LEVEL: Status: ACTIVE | Noted: 2025-08-26

## 2025-08-26 PROCEDURE — 99204 OFFICE O/P NEW MOD 45 MIN: CPT | Performed by: PHYSICIAN ASSISTANT

## 2025-08-26 RX ORDER — SPIRONOLACTONE 50 MG/1
1 TABLET, FILM COATED ORAL EVERY 12 HOURS SCHEDULED
COMMUNITY
Start: 2025-08-21

## 2025-08-26 RX ORDER — TACROLIMUS 1 MG/G
1 OINTMENT TOPICAL 2 TIMES DAILY
COMMUNITY
Start: 2025-08-20

## 2025-08-28 ENCOUNTER — PATIENT ROUNDING (BHMG ONLY) (OUTPATIENT)
Dept: ENDOCRINOLOGY | Facility: CLINIC | Age: 35
End: 2025-08-28
Payer: COMMERCIAL

## (undated) DEVICE — SOL IRR H2O BO 1000ML STRL

## (undated) DEVICE — TBG CONN INFLOW AQUILEX/MYOSURE

## (undated) DEVICE — SOL IRR NACL 0.9PCT 3000ML

## (undated) DEVICE — SEAL HYSTERSCOPE/OUTFLOW CHANNEL MYOSURE

## (undated) DEVICE — SINGLE PORT MANIFOLD: Brand: NEPTUNE 2

## (undated) DEVICE — TBG CONN OUTFLOW AQUILEX/MYOSURE

## (undated) DEVICE — DRAPE UNDERBUTTOCKS W FLUID POUCH 40X44IN

## (undated) DEVICE — GLV SURG BIOGEL M LTX PF 6 1/2

## (undated) DEVICE — RICH MINOR LITHOTOMY: Brand: MEDLINE INDUSTRIES, INC.